# Patient Record
Sex: MALE | Race: WHITE | NOT HISPANIC OR LATINO | Employment: OTHER | URBAN - METROPOLITAN AREA
[De-identification: names, ages, dates, MRNs, and addresses within clinical notes are randomized per-mention and may not be internally consistent; named-entity substitution may affect disease eponyms.]

---

## 2018-04-30 ENCOUNTER — APPOINTMENT (OUTPATIENT)
Dept: PREADMISSION TESTING | Facility: HOSPITAL | Age: 48
End: 2018-04-30

## 2018-04-30 VITALS — WEIGHT: 227.29 LBS | BODY MASS INDEX: 30.12 KG/M2 | HEIGHT: 73 IN

## 2018-04-30 LAB
DEPRECATED RDW RBC AUTO: 46.1 FL (ref 37–54)
ERYTHROCYTE [DISTWIDTH] IN BLOOD BY AUTOMATED COUNT: 13.1 % (ref 11.3–14.5)
HCT VFR BLD AUTO: 46 % (ref 38.9–50.9)
HGB BLD-MCNC: 15.8 G/DL (ref 13.1–17.5)
MCH RBC QN AUTO: 32.8 PG (ref 27–31)
MCHC RBC AUTO-ENTMCNC: 34.3 G/DL (ref 32–36)
MCV RBC AUTO: 95.4 FL (ref 80–99)
PLATELET # BLD AUTO: 296 10*3/MM3 (ref 150–450)
PMV BLD AUTO: 9.5 FL (ref 6–12)
RBC # BLD AUTO: 4.82 10*6/MM3 (ref 4.2–5.76)
WBC NRBC COR # BLD: 8 10*3/MM3 (ref 3.5–10.8)

## 2018-04-30 PROCEDURE — 36415 COLL VENOUS BLD VENIPUNCTURE: CPT

## 2018-04-30 PROCEDURE — 85027 COMPLETE CBC AUTOMATED: CPT | Performed by: ANESTHESIOLOGY

## 2018-04-30 RX ORDER — HYDROCODONE BITARTRATE AND ACETAMINOPHEN 7.5; 325 MG/1; MG/1
1 TABLET ORAL EVERY 12 HOURS PRN
COMMUNITY
End: 2018-05-02 | Stop reason: HOSPADM

## 2018-04-30 RX ORDER — RANITIDINE 150 MG/1
150 TABLET ORAL DAILY
COMMUNITY

## 2018-05-01 ENCOUNTER — ANESTHESIA EVENT (OUTPATIENT)
Dept: PERIOP | Facility: HOSPITAL | Age: 48
End: 2018-05-01

## 2018-05-01 ENCOUNTER — HOSPITAL ENCOUNTER (INPATIENT)
Facility: HOSPITAL | Age: 48
LOS: 1 days | Discharge: HOME | End: 2018-05-02
Attending: ORTHOPAEDIC SURGERY | Admitting: ORTHOPAEDIC SURGERY

## 2018-05-01 ENCOUNTER — ANESTHESIA (OUTPATIENT)
Dept: PERIOP | Facility: HOSPITAL | Age: 48
End: 2018-05-01

## 2018-05-01 ENCOUNTER — APPOINTMENT (OUTPATIENT)
Dept: GENERAL RADIOLOGY | Facility: HOSPITAL | Age: 48
End: 2018-05-01

## 2018-05-01 PROBLEM — M54.2 NECK PAIN: Status: ACTIVE | Noted: 2018-05-01

## 2018-05-01 PROBLEM — Z98.1 S/P CERVICAL SPINAL FUSION: Status: ACTIVE | Noted: 2018-05-01

## 2018-05-01 PROBLEM — Z72.0 TOBACCO ABUSE: Status: ACTIVE | Noted: 2018-05-01

## 2018-05-01 PROBLEM — M54.12 CERVICAL RADICULOPATHY AT C7: Status: ACTIVE | Noted: 2018-05-01

## 2018-05-01 PROBLEM — K21.9 GERD (GASTROESOPHAGEAL REFLUX DISEASE): Status: ACTIVE | Noted: 2018-05-01

## 2018-05-01 PROCEDURE — C1713 ANCHOR/SCREW BN/BN,TIS/BN: HCPCS | Performed by: ORTHOPAEDIC SURGERY

## 2018-05-01 PROCEDURE — 25810000003 SODIUM CHLORIDE 0.9 % WITH KCL 20 MEQ 20-0.9 MEQ/L-% SOLUTION: Performed by: ORTHOPAEDIC SURGERY

## 2018-05-01 PROCEDURE — 25010000002 FENTANYL CITRATE (PF) 100 MCG/2ML SOLUTION: Performed by: NURSE ANESTHETIST, CERTIFIED REGISTERED

## 2018-05-01 PROCEDURE — 25010000002 DEXAMETHASONE PER 1 MG: Performed by: NURSE ANESTHETIST, CERTIFIED REGISTERED

## 2018-05-01 PROCEDURE — 0RG20A0 FUSION OF 2 OR MORE CERVICAL VERTEBRAL JOINTS WITH INTERBODY FUSION DEVICE, ANTERIOR APPROACH, ANTERIOR COLUMN, OPEN APPROACH: ICD-10-PCS | Performed by: ORTHOPAEDIC SURGERY

## 2018-05-01 PROCEDURE — 25010000002 ONDANSETRON PER 1 MG: Performed by: NURSE ANESTHETIST, CERTIFIED REGISTERED

## 2018-05-01 PROCEDURE — 0RB30ZZ EXCISION OF CERVICAL VERTEBRAL DISC, OPEN APPROACH: ICD-10-PCS | Performed by: ORTHOPAEDIC SURGERY

## 2018-05-01 PROCEDURE — 25010000002 NEOSTIGMINE 10 MG/10ML SOLUTION: Performed by: NURSE ANESTHETIST, CERTIFIED REGISTERED

## 2018-05-01 PROCEDURE — 25010000002 PHENYLEPHRINE PER 1 ML: Performed by: NURSE ANESTHETIST, CERTIFIED REGISTERED

## 2018-05-01 PROCEDURE — 72020 X-RAY EXAM OF SPINE 1 VIEW: CPT

## 2018-05-01 PROCEDURE — 94799 UNLISTED PULMONARY SVC/PX: CPT

## 2018-05-01 PROCEDURE — L0120 CERV FLEX N/ADJ FOAM PRE OTS: HCPCS | Performed by: ORTHOPAEDIC SURGERY

## 2018-05-01 PROCEDURE — 93010 ELECTROCARDIOGRAM REPORT: CPT | Performed by: INTERNAL MEDICINE

## 2018-05-01 PROCEDURE — 93005 ELECTROCARDIOGRAM TRACING: CPT | Performed by: ANESTHESIOLOGY

## 2018-05-01 PROCEDURE — 25010000002 PROPOFOL 10 MG/ML EMULSION: Performed by: NURSE ANESTHETIST, CERTIFIED REGISTERED

## 2018-05-01 PROCEDURE — 63710000001 DEXAMETHASONE PER 0.25 MG: Performed by: ORTHOPAEDIC SURGERY

## 2018-05-01 PROCEDURE — 25010000002 KETOROLAC TROMETHAMINE PER 15 MG: Performed by: NURSE ANESTHETIST, CERTIFIED REGISTERED

## 2018-05-01 PROCEDURE — 25010000003 CEFAZOLIN IN DEXTROSE 2-4 GM/100ML-% SOLUTION: Performed by: ORTHOPAEDIC SURGERY

## 2018-05-01 DEVICE — PLT SKYLINE 2LVL 32MM: Type: IMPLANTABLE DEVICE | Site: SPINE CERVICAL | Status: FUNCTIONAL

## 2018-05-01 DEVICE — SCRW SKYLINE VAR SD 14MM: Type: IMPLANTABLE DEVICE | Site: SPINE CERVICAL | Status: FUNCTIONAL

## 2018-05-01 DEVICE — ALLOGRFT BONE VIVIGEN CELLUAR MATRX FORMABLE 1CC: Type: IMPLANTABLE DEVICE | Status: FUNCTIONAL

## 2018-05-01 DEVICE — BENGAL SYSTEM LARGE IMPLANT 4MM, 7 DEGREES
Type: IMPLANTABLE DEVICE | Site: SPINE CERVICAL | Status: FUNCTIONAL
Brand: BENGAL

## 2018-05-01 RX ORDER — CEFAZOLIN SODIUM 2 G/100ML
2 INJECTION, SOLUTION INTRAVENOUS EVERY 8 HOURS
Status: COMPLETED | OUTPATIENT
Start: 2018-05-01 | End: 2018-05-02

## 2018-05-01 RX ORDER — BENZOIN/ALOE VERA/STORAX/TOLU 10-2-8-4%
TINCTURE TOPICAL AS NEEDED
Status: DISCONTINUED | OUTPATIENT
Start: 2018-05-01 | End: 2018-05-01 | Stop reason: HOSPADM

## 2018-05-01 RX ORDER — NALOXONE HCL 0.4 MG/ML
0.4 VIAL (ML) INJECTION
Status: DISCONTINUED | OUTPATIENT
Start: 2018-05-01 | End: 2018-05-02 | Stop reason: HOSPADM

## 2018-05-01 RX ORDER — FAMOTIDINE 20 MG/1
20 TABLET, FILM COATED ORAL ONCE
Status: COMPLETED | OUTPATIENT
Start: 2018-05-01 | End: 2018-05-01

## 2018-05-01 RX ORDER — LIDOCAINE HYDROCHLORIDE 10 MG/ML
INJECTION, SOLUTION EPIDURAL; INFILTRATION; INTRACAUDAL; PERINEURAL AS NEEDED
Status: DISCONTINUED | OUTPATIENT
Start: 2018-05-01 | End: 2018-05-01 | Stop reason: SURG

## 2018-05-01 RX ORDER — CEFAZOLIN SODIUM 2 G/100ML
2 INJECTION, SOLUTION INTRAVENOUS ONCE
Status: DISCONTINUED | OUTPATIENT
Start: 2018-05-01 | End: 2018-05-01 | Stop reason: HOSPADM

## 2018-05-01 RX ORDER — FENTANYL CITRATE 50 UG/ML
INJECTION, SOLUTION INTRAMUSCULAR; INTRAVENOUS AS NEEDED
Status: DISCONTINUED | OUTPATIENT
Start: 2018-05-01 | End: 2018-05-01 | Stop reason: SURG

## 2018-05-01 RX ORDER — PANTOPRAZOLE SODIUM 40 MG/10ML
40 INJECTION, POWDER, LYOPHILIZED, FOR SOLUTION INTRAVENOUS ONCE
Status: DISCONTINUED | OUTPATIENT
Start: 2018-05-01 | End: 2018-05-01

## 2018-05-01 RX ORDER — LIDOCAINE HYDROCHLORIDE 10 MG/ML
0.5 INJECTION, SOLUTION EPIDURAL; INFILTRATION; INTRACAUDAL; PERINEURAL ONCE AS NEEDED
Status: COMPLETED | OUTPATIENT
Start: 2018-05-01 | End: 2018-05-01

## 2018-05-01 RX ORDER — SODIUM CHLORIDE, SODIUM LACTATE, POTASSIUM CHLORIDE, CALCIUM CHLORIDE 600; 310; 30; 20 MG/100ML; MG/100ML; MG/100ML; MG/100ML
9 INJECTION, SOLUTION INTRAVENOUS CONTINUOUS
Status: DISCONTINUED | OUTPATIENT
Start: 2018-05-01 | End: 2018-05-02 | Stop reason: HOSPADM

## 2018-05-01 RX ORDER — SODIUM CHLORIDE 0.9 % (FLUSH) 0.9 %
1-10 SYRINGE (ML) INJECTION AS NEEDED
Status: DISCONTINUED | OUTPATIENT
Start: 2018-05-01 | End: 2018-05-02 | Stop reason: HOSPADM

## 2018-05-01 RX ORDER — SODIUM CHLORIDE 0.9 % (FLUSH) 0.9 %
1-10 SYRINGE (ML) INJECTION AS NEEDED
Status: DISCONTINUED | OUTPATIENT
Start: 2018-05-01 | End: 2018-05-01 | Stop reason: HOSPADM

## 2018-05-01 RX ORDER — GLYCOPYRROLATE 0.2 MG/ML
INJECTION INTRAMUSCULAR; INTRAVENOUS AS NEEDED
Status: DISCONTINUED | OUTPATIENT
Start: 2018-05-01 | End: 2018-05-01 | Stop reason: SURG

## 2018-05-01 RX ORDER — HYDRALAZINE HYDROCHLORIDE 20 MG/ML
10 INJECTION INTRAMUSCULAR; INTRAVENOUS EVERY 6 HOURS PRN
Status: DISCONTINUED | OUTPATIENT
Start: 2018-05-01 | End: 2018-05-02 | Stop reason: HOSPADM

## 2018-05-01 RX ORDER — DEXAMETHASONE 4 MG/1
4 TABLET ORAL EVERY 6 HOURS SCHEDULED
Status: DISCONTINUED | OUTPATIENT
Start: 2018-05-01 | End: 2018-05-02 | Stop reason: HOSPADM

## 2018-05-01 RX ORDER — FENTANYL CITRATE 50 UG/ML
50 INJECTION, SOLUTION INTRAMUSCULAR; INTRAVENOUS
Status: DISCONTINUED | OUTPATIENT
Start: 2018-05-01 | End: 2018-05-01 | Stop reason: HOSPADM

## 2018-05-01 RX ORDER — ROCURONIUM BROMIDE 10 MG/ML
INJECTION, SOLUTION INTRAVENOUS AS NEEDED
Status: DISCONTINUED | OUTPATIENT
Start: 2018-05-01 | End: 2018-05-01 | Stop reason: SURG

## 2018-05-01 RX ORDER — PROPOFOL 10 MG/ML
VIAL (ML) INTRAVENOUS AS NEEDED
Status: DISCONTINUED | OUTPATIENT
Start: 2018-05-01 | End: 2018-05-01 | Stop reason: SURG

## 2018-05-01 RX ORDER — NEOSTIGMINE METHYLSULFATE 1 MG/ML
INJECTION, SOLUTION INTRAVENOUS AS NEEDED
Status: DISCONTINUED | OUTPATIENT
Start: 2018-05-01 | End: 2018-05-01 | Stop reason: SURG

## 2018-05-01 RX ORDER — NICOTINE 21 MG/24HR
1 PATCH, TRANSDERMAL 24 HOURS TRANSDERMAL EVERY 24 HOURS
Status: DISCONTINUED | OUTPATIENT
Start: 2018-05-01 | End: 2018-05-02 | Stop reason: HOSPADM

## 2018-05-01 RX ORDER — BISACODYL 5 MG/1
5 TABLET, DELAYED RELEASE ORAL DAILY PRN
Status: DISCONTINUED | OUTPATIENT
Start: 2018-05-01 | End: 2018-05-02 | Stop reason: HOSPADM

## 2018-05-01 RX ORDER — ZOLPIDEM TARTRATE 5 MG/1
5 TABLET ORAL NIGHTLY PRN
Status: DISCONTINUED | OUTPATIENT
Start: 2018-05-01 | End: 2018-05-02 | Stop reason: HOSPADM

## 2018-05-01 RX ORDER — ONDANSETRON 2 MG/ML
INJECTION INTRAMUSCULAR; INTRAVENOUS AS NEEDED
Status: DISCONTINUED | OUTPATIENT
Start: 2018-05-01 | End: 2018-05-01 | Stop reason: SURG

## 2018-05-01 RX ORDER — ONDANSETRON 2 MG/ML
4 INJECTION INTRAMUSCULAR; INTRAVENOUS EVERY 6 HOURS PRN
Status: DISCONTINUED | OUTPATIENT
Start: 2018-05-01 | End: 2018-05-02 | Stop reason: HOSPADM

## 2018-05-01 RX ORDER — HYDROMORPHONE HYDROCHLORIDE 1 MG/ML
0.5 INJECTION, SOLUTION INTRAMUSCULAR; INTRAVENOUS; SUBCUTANEOUS
Status: DISCONTINUED | OUTPATIENT
Start: 2018-05-01 | End: 2018-05-01 | Stop reason: HOSPADM

## 2018-05-01 RX ORDER — VECURONIUM BROMIDE 20 MG/20ML
INJECTION, POWDER, LYOPHILIZED, FOR SOLUTION INTRAVENOUS AS NEEDED
Status: DISCONTINUED | OUTPATIENT
Start: 2018-05-01 | End: 2018-05-01 | Stop reason: SURG

## 2018-05-01 RX ORDER — FAMOTIDINE 20 MG/1
20 TABLET, FILM COATED ORAL EVERY 12 HOURS SCHEDULED
Status: DISCONTINUED | OUTPATIENT
Start: 2018-05-01 | End: 2018-05-02 | Stop reason: HOSPADM

## 2018-05-01 RX ORDER — ACETAMINOPHEN 325 MG/1
650 TABLET ORAL EVERY 4 HOURS PRN
Status: DISCONTINUED | OUTPATIENT
Start: 2018-05-01 | End: 2018-05-02 | Stop reason: HOSPADM

## 2018-05-01 RX ORDER — KETOROLAC TROMETHAMINE 30 MG/ML
INJECTION, SOLUTION INTRAMUSCULAR; INTRAVENOUS AS NEEDED
Status: DISCONTINUED | OUTPATIENT
Start: 2018-05-01 | End: 2018-05-01 | Stop reason: SURG

## 2018-05-01 RX ORDER — MAGNESIUM HYDROXIDE 1200 MG/15ML
LIQUID ORAL AS NEEDED
Status: DISCONTINUED | OUTPATIENT
Start: 2018-05-01 | End: 2018-05-01 | Stop reason: HOSPADM

## 2018-05-01 RX ORDER — OXYCODONE AND ACETAMINOPHEN 7.5; 325 MG/1; MG/1
1 TABLET ORAL EVERY 4 HOURS PRN
Status: DISCONTINUED | OUTPATIENT
Start: 2018-05-01 | End: 2018-05-02 | Stop reason: HOSPADM

## 2018-05-01 RX ORDER — BISACODYL 10 MG
10 SUPPOSITORY, RECTAL RECTAL DAILY PRN
Status: DISCONTINUED | OUTPATIENT
Start: 2018-05-01 | End: 2018-05-02 | Stop reason: HOSPADM

## 2018-05-01 RX ORDER — DEXAMETHASONE SODIUM PHOSPHATE 4 MG/ML
4 INJECTION, SOLUTION INTRA-ARTICULAR; INTRALESIONAL; INTRAMUSCULAR; INTRAVENOUS; SOFT TISSUE EVERY 6 HOURS SCHEDULED
Status: DISCONTINUED | OUTPATIENT
Start: 2018-05-01 | End: 2018-05-02 | Stop reason: HOSPADM

## 2018-05-01 RX ORDER — HYDROMORPHONE HYDROCHLORIDE 1 MG/ML
2 INJECTION, SOLUTION INTRAMUSCULAR; INTRAVENOUS; SUBCUTANEOUS EVERY 4 HOURS PRN
Status: DISCONTINUED | OUTPATIENT
Start: 2018-05-01 | End: 2018-05-02 | Stop reason: HOSPADM

## 2018-05-01 RX ORDER — DEXAMETHASONE SODIUM PHOSPHATE 4 MG/ML
INJECTION, SOLUTION INTRA-ARTICULAR; INTRALESIONAL; INTRAMUSCULAR; INTRAVENOUS; SOFT TISSUE AS NEEDED
Status: DISCONTINUED | OUTPATIENT
Start: 2018-05-01 | End: 2018-05-01 | Stop reason: SURG

## 2018-05-01 RX ORDER — SODIUM CHLORIDE AND POTASSIUM CHLORIDE 150; 900 MG/100ML; MG/100ML
100 INJECTION, SOLUTION INTRAVENOUS CONTINUOUS
Status: DISCONTINUED | OUTPATIENT
Start: 2018-05-01 | End: 2018-05-02 | Stop reason: HOSPADM

## 2018-05-01 RX ADMIN — EPHEDRINE SULFATE 10 MG: 50 INJECTION INTRAMUSCULAR; INTRAVENOUS; SUBCUTANEOUS at 08:57

## 2018-05-01 RX ADMIN — DEXAMETHASONE SODIUM PHOSPHATE 8 MG: 4 INJECTION, SOLUTION INTRAMUSCULAR; INTRAVENOUS at 08:11

## 2018-05-01 RX ADMIN — DEXAMETHASONE 4 MG: 4 TABLET ORAL at 13:13

## 2018-05-01 RX ADMIN — FAMOTIDINE 20 MG: 20 TABLET ORAL at 06:55

## 2018-05-01 RX ADMIN — FENTANYL CITRATE 50 MCG: 50 INJECTION, SOLUTION INTRAMUSCULAR; INTRAVENOUS at 08:18

## 2018-05-01 RX ADMIN — POTASSIUM CHLORIDE AND SODIUM CHLORIDE 100 ML/HR: 900; 150 INJECTION, SOLUTION INTRAVENOUS at 11:28

## 2018-05-01 RX ADMIN — NEOSTIGMINE METHYLSULFATE 3 MG: 1 INJECTION, SOLUTION INTRAVENOUS at 09:41

## 2018-05-01 RX ADMIN — ONDANSETRON 4 MG: 2 INJECTION INTRAMUSCULAR; INTRAVENOUS at 09:27

## 2018-05-01 RX ADMIN — OXYCODONE HYDROCHLORIDE AND ACETAMINOPHEN 1 TABLET: 7.5; 325 TABLET ORAL at 15:23

## 2018-05-01 RX ADMIN — EPHEDRINE SULFATE 10 MG: 50 INJECTION INTRAMUSCULAR; INTRAVENOUS; SUBCUTANEOUS at 08:43

## 2018-05-01 RX ADMIN — DEXAMETHASONE 4 MG: 4 TABLET ORAL at 18:41

## 2018-05-01 RX ADMIN — FAMOTIDINE 20 MG: 20 TABLET, FILM COATED ORAL at 21:26

## 2018-05-01 RX ADMIN — CEFAZOLIN SODIUM 2 G: 2 INJECTION, SOLUTION INTRAVENOUS at 15:23

## 2018-05-01 RX ADMIN — FENTANYL CITRATE 50 MCG: 50 INJECTION INTRAMUSCULAR; INTRAVENOUS at 10:04

## 2018-05-01 RX ADMIN — LIDOCAINE HYDROCHLORIDE 50 MG: 10 INJECTION, SOLUTION EPIDURAL; INFILTRATION; INTRACAUDAL; PERINEURAL at 08:03

## 2018-05-01 RX ADMIN — SODIUM CHLORIDE, POTASSIUM CHLORIDE, SODIUM LACTATE AND CALCIUM CHLORIDE: 600; 310; 30; 20 INJECTION, SOLUTION INTRAVENOUS at 07:58

## 2018-05-01 RX ADMIN — PHENYLEPHRINE HYDROCHLORIDE 100 MCG: 10 INJECTION INTRAVENOUS at 08:57

## 2018-05-01 RX ADMIN — OXYCODONE HYDROCHLORIDE AND ACETAMINOPHEN 1 TABLET: 7.5; 325 TABLET ORAL at 21:27

## 2018-05-01 RX ADMIN — FENTANYL CITRATE 50 MCG: 50 INJECTION INTRAMUSCULAR; INTRAVENOUS at 10:11

## 2018-05-01 RX ADMIN — GLYCOPYRROLATE 0.4 MG: 0.2 INJECTION, SOLUTION INTRAMUSCULAR; INTRAVENOUS at 09:41

## 2018-05-01 RX ADMIN — ROCURONIUM BROMIDE 40 MG: 10 SOLUTION INTRAVENOUS at 08:03

## 2018-05-01 RX ADMIN — FAMOTIDINE 20 MG: 20 TABLET, FILM COATED ORAL at 13:23

## 2018-05-01 RX ADMIN — LIDOCAINE HYDROCHLORIDE 0.5 ML: 10 INJECTION, SOLUTION EPIDURAL; INFILTRATION; INTRACAUDAL; PERINEURAL at 06:50

## 2018-05-01 RX ADMIN — SODIUM CHLORIDE, POTASSIUM CHLORIDE, SODIUM LACTATE AND CALCIUM CHLORIDE 9 ML/HR: 600; 310; 30; 20 INJECTION, SOLUTION INTRAVENOUS at 06:50

## 2018-05-01 RX ADMIN — FENTANYL CITRATE 100 MCG: 50 INJECTION, SOLUTION INTRAMUSCULAR; INTRAVENOUS at 08:53

## 2018-05-01 RX ADMIN — FENTANYL CITRATE 50 MCG: 50 INJECTION, SOLUTION INTRAMUSCULAR; INTRAVENOUS at 08:03

## 2018-05-01 RX ADMIN — PHENYLEPHRINE HYDROCHLORIDE 100 MCG: 10 INJECTION INTRAVENOUS at 08:41

## 2018-05-01 RX ADMIN — FENTANYL CITRATE 50 MCG: 50 INJECTION INTRAMUSCULAR; INTRAVENOUS at 10:16

## 2018-05-01 RX ADMIN — NICOTINE 1 PATCH: 21 PATCH, EXTENDED RELEASE TRANSDERMAL at 13:23

## 2018-05-01 RX ADMIN — PHENYLEPHRINE HYDROCHLORIDE 100 MCG: 10 INJECTION INTRAVENOUS at 09:06

## 2018-05-01 RX ADMIN — OXYCODONE HYDROCHLORIDE AND ACETAMINOPHEN 1 TABLET: 7.5; 325 TABLET ORAL at 11:19

## 2018-05-01 RX ADMIN — PROPOFOL 200 MG: 10 INJECTION, EMULSION INTRAVENOUS at 08:03

## 2018-05-01 RX ADMIN — KETOROLAC TROMETHAMINE 30 MG: 30 INJECTION, SOLUTION INTRAMUSCULAR at 09:31

## 2018-05-01 RX ADMIN — VECURONIUM BROMIDE 2 MG: 1 INJECTION, POWDER, LYOPHILIZED, FOR SOLUTION INTRAVENOUS at 08:53

## 2018-05-01 NOTE — ANESTHESIA PREPROCEDURE EVALUATION
Anesthesia Evaluation     Patient summary reviewed and Nursing notes reviewed   no history of anesthetic complications:  NPO Solid Status: > 8 hours  NPO Liquid Status: > 2 hours           Airway   Mallampati: II  TM distance: >3 FB  Neck ROM: full  Possible difficult intubation  Dental          Pulmonary - normal exam    breath sounds clear to auscultation  (+) a smoker Former, sleep apnea (snores heavily per wife and stops breathing),   Cardiovascular - normal exam  Exercise tolerance: good (4-7 METS)    ECG reviewed  Rhythm: regular  Rate: normal    (-) hypertension, valvular problems/murmurs, dysrhythmias, angina, GONZALEZ, hyperlipidemia      Neuro/Psych  (+) numbness (right hand),     GI/Hepatic/Renal/Endo    (+) obesity,  GERD well controlled,    (-) hepatitis, liver disease, no renal disease, diabetes, hypothyroidism    Musculoskeletal     (+) neck pain, radiculopathy Right upper extremity  Abdominal    Substance History      OB/GYN          Other        (-) blood dyscrasia, history of cancer                Anesthesia Plan    ASA 2     general   (Labs/studies reviewed  glidescope)  intravenous induction   Anesthetic plan and risks discussed with patient.    Plan discussed with CRNA.

## 2018-05-01 NOTE — ANESTHESIA PROCEDURE NOTES
Airway  Urgency: elective    Airway not difficult    General Information and Staff    Patient location during procedure: OR  CRNA: OLGA BANUELOS    Indications and Patient Condition  Indications for airway management: airway protection    Preoxygenated: yes  MILS not maintained throughout  Mask difficulty assessment: 1 - vent by mask    Final Airway Details  Final airway type: endotracheal airway      Successful airway: ETT  Cuffed: yes   Successful intubation technique: direct laryngoscopy  Facilitating devices/methods: intubating stylet  Endotracheal tube insertion site: oral  Blade: Bryant  Blade size: #3  ETT size: 7.5 mm  Cormack-Lehane Classification: grade I - full view of glottis  Placement verified by: chest auscultation and capnometry   Measured from: lips  ETT to lips (cm): 20  Number of attempts at approach: 1    Additional Comments  Negative epigastric sounds, Breath sound equal bilaterally with symmetric chest rise and fall. Atraumatic, no damage to lips or teeth during intubation

## 2018-05-01 NOTE — BRIEF OP NOTE
Ortho Spine CERVICAL DISCECTOMY ANTERIOR WITH FUSION WITH BENGAL CAGE ANTERIOR PLATING c5-7  Brief Op Note    Foster Saldivar  5/1/2018    Pre-op Diagnosis:   No diagnosis found.    Post-op Diagnosis:  No diagnosis found.    Procedure:  ACDF C5-6, C6-7  Cage placement with plating  Local autograft with placement cellular bone matrix    Anesthesia:  General    Staff:   Circulator: Shakila Marie RN; Laurence Baez RN  Radiology Technologist: Alex Rubio, RT  Scrub Person: Harlan Bartlett  Vendor Representative: Cortez Salazar  Nursing Assistant: Kierra Curtis CNA  Assistant: ADELAIDA Sheikh    Estimated Blood Loss:  10 mL    Specimens:  * No orders in the log *      Drains:   Penrose    Complications: None    Frank Ridley MD     Date: 5/1/2018  Time: 9:45 AM

## 2018-05-01 NOTE — ANESTHESIA POSTPROCEDURE EVALUATION
Patient: Foster Saldivar    Procedure Summary     Date:  05/01/18 Room / Location:   [facility]  /  [facility]    Anesthesia Start:  0758 Anesthesia Stop:      Procedure:  CERVICAL DISCECTOMY ANTERIOR WITH FUSION WITH BENGAL CAGE ANTERIOR PLATING c5-7 (N/A Spine Cervical) Diagnosis:      Surgeon:  Frank Ridley MD Provider:  Marine Garland MD    Anesthesia Type:  general ASA Status:  2          Anesthesia Type: general  Last vitals  BP   125/75   Temp   97.2   Pulse   96   Resp   18     SpO2   96     Post Anesthesia Care and Evaluation    Patient location during evaluation: PACU  Patient participation: complete - patient participated  Level of consciousness: awake and responsive to verbal stimuli  Pain score: 2  Pain management: adequate  Airway patency: patent  Anesthetic complications: No anesthetic complications    Cardiovascular status: acceptable  Respiratory status: acceptable  Hydration status: acceptable    Comments: Pt awake and responsive. SV. VSS. Report to RN. Patient Vitals in the past 24 hrs:  05/01/18 0651, BP:123/79, Temp:97.1 °F (36.2 °C), Temp src:Temporal Art, Pulse:64, Resp:16, SpO2:97 %  133/78. p 72. r 16. t 98.1

## 2018-05-01 NOTE — H&P
Patient Care Team:      Chief complaint Neck Pain    Subjective:    Patient is a 48 y.o.male presents with HNP, C6-7 and spondylosis C5-6.  Review of Systems:  General ROS: GERD  Cardiovascular ROS: no chest pain or dyspnea on exertion  Respiratory ROS: no cough, shortness of breath, or wheezing      Allergies: No Known Allergies       Latex: no  Contrast Dye: no    Home Meds    Prescriptions Prior to Admission   Medication Sig Dispense Refill Last Dose   • HYDROcodone-acetaminophen (NORCO) 7.5-325 MG per tablet Take 1 tablet by mouth Every 12 (Twelve) Hours As Needed for Moderate Pain .   Past Week at Unknown time   • raNITIdine (ZANTAC) 150 MG tablet Take 150 mg by mouth Daily.   5/1/2018 at 0530     PMH:   Past Medical History:   Diagnosis Date   • GERD (gastroesophageal reflux disease)    • Heartburn    • Neck pain    • PONV (postoperative nausea and vomiting)     no sugical hx   • Wears glasses \     PSH:    Past Surgical History:   Procedure Laterality Date   • NO PAST SURGERIES       Immunization History: pneumo: no   Flu: yes  Tetanus ?  Social History:   Tobacco: quit 4 days ago   Alcohol: 3-4 drinks 4,5 x wk      Physical Exam:/79 (BP Location: Right arm, Patient Position: Lying)   Pulse 64   Temp 97.1 °F (36.2 °C) (Temporal Artery )   Resp 16   SpO2 97%       General Appearance:    Alert, cooperative, no distress, appears stated age   Head:    Normocephalic, without obvious abnormality, atraumatic   Lungs:     Clear to auscultation bilaterally, respirations unlabored    Heart:   Regular rate and rhythm, S1 and S2 normal, no murmur, rub    or gallop    Abdomen:    Soft without tenderness   Breast Exam:    deferred   Genitalia:    deferred   Extremities:   Extremities normal, atraumatic, no cyanosis or edema   Skin:   Skin color, texture, turgor normal, no rashes or lesions   Neurologic:   Grossly intact        Cancer Patient: __ yes __no __unknown; If yes, clinical stage T:__ N:__M:__, stage  group    Impression: HNP C6-7, Spondylosis C5-6    Plan: ACDF, Bengal Cage, Anterior Plating  Charli Moss PA-C 5/1/2018 6:59 AM

## 2018-05-01 NOTE — PLAN OF CARE
Problem: Pain, Acute (Adult)  Goal: Acceptable Pain Control/Comfort Level  Outcome: Ongoing (interventions implemented as appropriate)   05/01/18 1581   Pain, Acute (Adult)   Acceptable Pain Control/Comfort Level making progress toward outcome

## 2018-05-01 NOTE — H&P
Patient Name: Foster Saldivar  MRN: [id number]  : 1970  DOS: 2018    Attending: Frank Ridley MD    Primary Care Provider: Luis Esteban MD      Chief complaint:  Neck Pain    Subjective   Patient is a 48 y.o. male presented for ACDF C5-6, C6-7 by Dr. Ridley under GA. He tolerated surgery well and is admitted for further medical management. His neck has been painful for a long time with numbness and tingling down his right arm.     When seen postop he is doing ok. He complains of pain in his shoulder blades. He denies sore throat or difficulty swallowing. He still has numbness and tingling of right hand. He denies nausea, shortness of breath or chest pain. No hx of DVT or PE.    ( Above is noted/ agree. Seen in his room afterwards. Ambulated. Sitting on couch. Comfortable. Good pain control.)wy       Allergies:  No Known Allergies    Meds:  Prescriptions Prior to Admission   Medication Sig Dispense Refill Last Dose   • HYDROcodone-acetaminophen (NORCO) 7.5-325 MG per tablet Take 1 tablet by mouth Every 12 (Twelve) Hours As Needed for Moderate Pain .   Past Week at Unknown time   • raNITIdine (ZANTAC) 150 MG tablet Take 150 mg by mouth Daily.   2018 at 0530       History:   Past Medical History:   Diagnosis Date   • GERD (gastroesophageal reflux disease)    • Heartburn    • Neck pain    • PONV (postoperative nausea and vomiting)     no sugical hx   • Wears glasses \     Past Surgical History:   Procedure Laterality Date   • NO PAST SURGERIES       History reviewed. No pertinent family history.  Social History   Substance Use Topics   • Smoking status: Former Smoker     Packs/day: 1.50     Years: 30.00     Types: Cigarettes     Quit date: 2018   • Smokeless tobacco: Never Used   • Alcohol use Yes      Comment: 3-5 DRINKS, MAYBE 3 DAYS PER WEEK   He is  with 2 children. He is self-employed in concrete.     Review of Systems  Pertinent items are noted in HPI, all other  systems reviewed and negative    Vital Signs  /70   Pulse 90   Temp 97.4 °F (36.3 °C)   Resp 24   SpO2 95%     Physical Exam:    General Appearance:    Alert, cooperative, in no acute distress   Head:    Normocephalic, without obvious abnormality, atraumatic   Eyes:            Lids and lashes normal, conjunctivae and sclerae normal, no   icterus, no pallor, corneas clear    Ears:    Ears appear intact with no abnormalities noted   Neck:   Bulky dressing CDI. Soft c-collar present   Lungs:     Clear to auscultation, diminished bases. respirations regular, even and unlabored    Heart:    Regular rhythm and normal rate, normal S1 and S2, no            murmur, no gallop, no rub, no click   Abdomen:     Normal bowel sounds, no masses, no organomegaly, soft        non-tender, non-distended, no guarding, no rebound                 tenderness   Genitalia:    Deferred   Extremities:   Moves all extremities well, no edema, no cyanosis, no              redness   Pulses:   Pulses palpable and equal bilaterally   Skin:   No bleeding, bruising or rash   Neurologic:   Cranial nerves 2 - 12 grossly intact, sensation intact      I reviewed the patient's new clinical results.         Results from last 7 days  Lab Units 18  1006   WBC 10*3/mm3 8.00   HEMOGLOBIN g/dL 15.8   HEMATOCRIT % 46.0   PLATELETS 10*3/mm3 296     Assessment and Plan:   Principal Problem:    S/P cervical spinal fusion  Active Problems:    Cervical radiculopathy at C7    GERD (gastroesophageal reflux disease)    Tobacco abuse, quit 18      Plan  1. Ambulate  2. Pain control-prns   3. IS-encourage  4. DVT proph- [facility]  5. Bowel regimen  6. Resume home medications as appropriate  7. Monitor post-op labs  8. DC planning for home, likely tomorrow    GERD  - Continue home H2B    Tobacco abuse  - Nicotine patch ( counseled on tobacco cessation. Tobacco Cessation Counselin  minutes of tobacco cessation counseling provided, including but not  limited to, risks of ongoing tobacco use, pertinence to current or future health status and availability/examples of cessation resources.  Patient expresses desire to quit).       I have personally performed the evaluation on this patient. My history is consistant  with HPI obtained. My exam finding are listed above. I have personally reviewed and discussed the above formulated treatment plan with VALENTINA Belcher MD  05/01/18  1:47 PM

## 2018-05-02 VITALS
TEMPERATURE: 97.6 F | OXYGEN SATURATION: 96 % | DIASTOLIC BLOOD PRESSURE: 77 MMHG | SYSTOLIC BLOOD PRESSURE: 133 MMHG | HEART RATE: 91 BPM | RESPIRATION RATE: 18 BRPM

## 2018-05-02 PROBLEM — D72.829 LEUKOCYTOSIS: Status: ACTIVE | Noted: 2018-05-02

## 2018-05-02 PROBLEM — G89.18 ACUTE POSTOPERATIVE PAIN: Status: ACTIVE | Noted: 2018-05-02

## 2018-05-02 LAB
ANION GAP SERPL CALCULATED.3IONS-SCNC: 8 MMOL/L (ref 3–11)
BUN BLD-MCNC: 12 MG/DL (ref 9–23)
BUN/CREAT SERPL: 15 (ref 7–25)
CALCIUM SPEC-SCNC: 9.3 MG/DL (ref 8.7–10.4)
CHLORIDE SERPL-SCNC: 106 MMOL/L (ref 99–109)
CO2 SERPL-SCNC: 22 MMOL/L (ref 20–31)
CREAT BLD-MCNC: 0.8 MG/DL (ref 0.6–1.3)
DEPRECATED RDW RBC AUTO: 46.7 FL (ref 37–54)
ERYTHROCYTE [DISTWIDTH] IN BLOOD BY AUTOMATED COUNT: 13.2 % (ref 11.3–14.5)
GFR SERPL CREATININE-BSD FRML MDRD: 102 ML/MIN/1.73
GLUCOSE BLD-MCNC: 137 MG/DL (ref 70–100)
HCT VFR BLD AUTO: 43 % (ref 38.9–50.9)
HGB BLD-MCNC: 14.6 G/DL (ref 13.1–17.5)
MCH RBC QN AUTO: 32.7 PG (ref 27–31)
MCHC RBC AUTO-ENTMCNC: 34 G/DL (ref 32–36)
MCV RBC AUTO: 96.4 FL (ref 80–99)
PLATELET # BLD AUTO: 306 10*3/MM3 (ref 150–450)
PMV BLD AUTO: 10.1 FL (ref 6–12)
POTASSIUM BLD-SCNC: 4.2 MMOL/L (ref 3.5–5.5)
RBC # BLD AUTO: 4.46 10*6/MM3 (ref 4.2–5.76)
SODIUM BLD-SCNC: 136 MMOL/L (ref 132–146)
WBC NRBC COR # BLD: 13.66 10*3/MM3 (ref 3.5–10.8)

## 2018-05-02 PROCEDURE — 85027 COMPLETE CBC AUTOMATED: CPT | Performed by: NURSE PRACTITIONER

## 2018-05-02 PROCEDURE — 25010000003 CEFAZOLIN IN DEXTROSE 2-4 GM/100ML-% SOLUTION: Performed by: ORTHOPAEDIC SURGERY

## 2018-05-02 PROCEDURE — 80048 BASIC METABOLIC PNL TOTAL CA: CPT | Performed by: NURSE PRACTITIONER

## 2018-05-02 PROCEDURE — 63710000001 DEXAMETHASONE PER 0.25 MG: Performed by: ORTHOPAEDIC SURGERY

## 2018-05-02 RX ORDER — DOCUSATE SODIUM 100 MG/1
100 CAPSULE, LIQUID FILLED ORAL 2 TIMES DAILY
Qty: 60 CAPSULE | Refills: 0 | Status: SHIPPED | OUTPATIENT
Start: 2018-05-02

## 2018-05-02 RX ORDER — HYDROCODONE BITARTRATE AND ACETAMINOPHEN 10; 325 MG/1; MG/1
1 TABLET ORAL EVERY 6 HOURS PRN
Qty: 40 TABLET | Refills: 0
Start: 2018-05-02

## 2018-05-02 RX ADMIN — OXYCODONE HYDROCHLORIDE AND ACETAMINOPHEN 1 TABLET: 7.5; 325 TABLET ORAL at 08:46

## 2018-05-02 RX ADMIN — DEXAMETHASONE 4 MG: 4 TABLET ORAL at 00:41

## 2018-05-02 RX ADMIN — CEFAZOLIN SODIUM 2 G: 2 INJECTION, SOLUTION INTRAVENOUS at 00:41

## 2018-05-02 RX ADMIN — DEXAMETHASONE 4 MG: 4 TABLET ORAL at 06:39

## 2018-05-02 RX ADMIN — FAMOTIDINE 20 MG: 20 TABLET, FILM COATED ORAL at 08:46

## 2018-05-02 NOTE — PROGRESS NOTES
Ortho Spine Progress Note     LOS: 1 day   Patient Care Team:  Luis Esteban MD as PCP - General (Internal Medicine)    Subjective   Doing well  Arm pain much improved  Objective     Vital Signs:  /78 (BP Location: Right arm, Patient Position: Lying)   Pulse 100   Temp 98.3 °F (36.8 °C) (Oral)   Resp 16   SpO2 94%          Labs:  Lab Results (last 24 hours)     Procedure Component Value Units Date/Time    Basic Metabolic Panel [[id number]]  (Abnormal) Collected:  05/02/18 0359    Specimen:  Blood Updated:  05/02/18 0546     Glucose 137 (H) mg/dL      BUN 12 mg/dL      Creatinine 0.80 mg/dL      Sodium 136 mmol/L      Potassium 4.2 mmol/L      Chloride 106 mmol/L      CO2 22.0 mmol/L      Calcium 9.3 mg/dL      eGFR Non African Amer 102 mL/min/1.73      BUN/Creatinine Ratio 15.0     Anion Gap 8.0 mmol/L     Narrative:       National Kidney Foundation Guidelines    Stage     Description        GFR  1         Normal or High     90+  2         Mild decrease      60-89  3         Moderate decrease  30-59  4         Severe decrease    15-29  5         Kidney failure     <15    CBC (No Diff) [[id number]]  (Abnormal) Collected:  05/02/18 0359    Specimen:  Blood Updated:  05/02/18 0503     WBC 13.66 (H) 10*3/mm3      Comment: Result checked        RBC 4.46 10*6/mm3      Hemoglobin 14.6 g/dL      Hematocrit 43.0 %      MCV 96.4 fL      MCH 32.7 (H) pg      MCHC 34.0 g/dL      RDW 13.2 %      RDW-SD 46.7 fl      MPV 10.1 fL      Platelets 306 10*3/mm3           Physical Exam:  Neuro intact    Assessment/Plan   Doing well  Home today    Frank Ridley MD  05/02/18  9:34 AM

## 2018-05-02 NOTE — PAYOR COMM NOTE
"Foster Styles (48 y.o. Male)   Id # [id number]  Ramona Rae RN, BSN  Phone # [phone]  Fax # [phone]    Date of Birth Social Security Number Address Home Phone MRN    1970  [address] [id number]  [id number]    Baptism Marital Status          None        Admission Date Admission Type Admitting Provider Attending Provider Department, Room/Bed    18 Elective Frank Ridley MD Stephens, George Chris, MD [address], [address]    Discharge Date Discharge Disposition Discharge Destination         Home or Self Care              Attending Provider:  Frank Ridley MD    Allergies:  No Known Allergies    Isolation:  None   Infection:  None   Code Status:  FULL    Ht:  185.4 cm (73\")   Wt:  103 kg (227 lb 4.7 oz)    Admission Cmt:  None   Principal Problem:  S/P cervical spinal fusion [Z98.1]                 Active Insurance as of 2018     Primary Coverage     Payor Plan Insurance Group Employer/Plan Group    [employer] [id number]     Payor Plan Address Payor Plan Phone Number Effective From Effective To    PO   2018     [place] [address]       Subscriber Name Subscriber Birth Date Member ID       BUTCH STYLES 1976 [id number]                 Emergency Contacts      (Rel.) Home Phone Work Phone Mobile Phone    Butch Styles (Spouse) -- -- [phone]               History & Physical      Keya Belcher MD at 2018 10:31 AM          Patient Name: Foster Styles  MRN: [id number]  : 1970  DOS: 2018    Attending: Frank Ridley MD    Primary Care Provider: Luis Esteban MD      Chief complaint:  Neck Pain    Subjective   Patient is a 48 y.o. male presented for ACDF C5-6, C6-7 by Dr. Ridley under GA. He tolerated surgery well and is admitted for further medical management. His neck has been painful for a long time with numbness and tingling down " his right arm.     When seen postop he is doing ok. He complains of pain in his shoulder blades. He denies sore throat or difficulty swallowing. He still has numbness and tingling of right hand. He denies nausea, shortness of breath or chest pain. No hx of DVT or PE.    ( Above is noted/ agree. Seen in his room afterwards. Ambulated. Sitting on couch. Comfortable. Good pain control.)wy       Allergies:  No Known Allergies    Meds:  Prescriptions Prior to Admission   Medication Sig Dispense Refill Last Dose   • HYDROcodone-acetaminophen (NORCO) 7.5-325 MG per tablet Take 1 tablet by mouth Every 12 (Twelve) Hours As Needed for Moderate Pain .   Past Week at Unknown time   • raNITIdine (ZANTAC) 150 MG tablet Take 150 mg by mouth Daily.   5/1/2018 at 0530       History:   Past Medical History:   Diagnosis Date   • GERD (gastroesophageal reflux disease)    • Heartburn    • Neck pain    • PONV (postoperative nausea and vomiting)     no sugical hx   • Wears glasses \     Past Surgical History:   Procedure Laterality Date   • NO PAST SURGERIES       History reviewed. No pertinent family history.  Social History   Substance Use Topics   • Smoking status: Former Smoker     Packs/day: 1.50     Years: 30.00     Types: Cigarettes     Quit date: 4/27/2018   • Smokeless tobacco: Never Used   • Alcohol use Yes      Comment: 3-5 DRINKS, MAYBE 3 DAYS PER WEEK   He is  with 2 children. He is self-employed in "[employer].     Review of Systems  Pertinent items are noted in HPI, all other systems reviewed and negative    Vital Signs  /70   Pulse 90   Temp 97.4 °F (36.3 °C)   Resp 24   SpO2 95%     Physical Exam:    General Appearance:    Alert, cooperative, in no acute distress   Head:    Normocephalic, without obvious abnormality, atraumatic   Eyes:            Lids and lashes normal, conjunctivae and sclerae normal, no   icterus, no pallor, corneas clear    Ears:    Ears appear intact with no abnormalities noted   Neck:    Bulky dressing CDI. Soft c-collar present   Lungs:     Clear to auscultation, diminished bases. respirations regular, even and unlabored    Heart:    Regular rhythm and normal rate, normal S1 and S2, no            murmur, no gallop, no rub, no click   Abdomen:     Normal bowel sounds, no masses, no organomegaly, soft        non-tender, non-distended, no guarding, no rebound                 tenderness   Genitalia:    Deferred   Extremities:   Moves all extremities well, no edema, no cyanosis, no              redness   Pulses:   Pulses palpable and equal bilaterally   Skin:   No bleeding, bruising or rash   Neurologic:   Cranial nerves 2 - 12 grossly intact, sensation intact      I reviewed the patient's new clinical results.         Results from last 7 days  Lab Units 18  1006   WBC 10*3/mm3 8.00   HEMOGLOBIN g/dL 15.8   HEMATOCRIT % 46.0   PLATELETS 10*3/mm3 296     Assessment and Plan:   Principal Problem:    S/P cervical spinal fusion  Active Problems:    Cervical radiculopathy at C7    GERD (gastroesophageal reflux disease)    Tobacco abuse, quit 18      Plan  1. Ambulate  2. Pain control-prns   3. IS-encourage  4. DVT proph- [facility]  5. Bowel regimen  6. Resume home medications as appropriate  7. Monitor post-op labs  8. DC planning for home, likely tomorrow    GERD  - Continue home H2B    Tobacco abuse  - Nicotine patch ( counseled on tobacco cessation. Tobacco Cessation Counselin  minutes of tobacco cessation counseling provided, including but not limited to, risks of ongoing tobacco use, pertinence to current or future health status and availability/examples of cessation resources.  Patient expresses desire to quit).       I have personally performed the evaluation on this patient. My history is consistant  with HPI obtained. My exam finding are listed above. I have personally reviewed and discussed the above formulated treatment plan with JADE.      Keya Belcher MD  18  1:47  PM    Electronically signed by Keya Belcher MD at 5/1/2018  1:48 PM     Frank Ridley MD at 5/1/2018  6:59 AM            Patient Care Team:      Chief complaint Neck Pain    Subjective:    Patient is a 48 y.o.male presents with HNP, C6-7 and spondylosis C5-6.  Review of Systems:  General ROS: GERD  Cardiovascular ROS: no chest pain or dyspnea on exertion  Respiratory ROS: no cough, shortness of breath, or wheezing      Allergies: No Known Allergies       Latex: no  Contrast Dye: no    Home Meds    Prescriptions Prior to Admission   Medication Sig Dispense Refill Last Dose   • HYDROcodone-acetaminophen (NORCO) 7.5-325 MG per tablet Take 1 tablet by mouth Every 12 (Twelve) Hours As Needed for Moderate Pain .   Past Week at Unknown time   • raNITIdine (ZANTAC) 150 MG tablet Take 150 mg by mouth Daily.   5/1/2018 at 0530     PMH:   Past Medical History:   Diagnosis Date   • GERD (gastroesophageal reflux disease)    • Heartburn    • Neck pain    • PONV (postoperative nausea and vomiting)     no sugical hx   • Wears glasses \     PSH:    Past Surgical History:   Procedure Laterality Date   • NO PAST SURGERIES       Immunization History: pneumo: no   Flu: yes  Tetanus ?  Social History:   Tobacco: quit 4 days ago   Alcohol: 3-4 drinks 4,5 x wk      Physical Exam:/79 (BP Location: Right arm, Patient Position: Lying)   Pulse 64   Temp 97.1 °F (36.2 °C) (Temporal Artery )   Resp 16   SpO2 97%       General Appearance:    Alert, cooperative, no distress, appears stated age   Head:    Normocephalic, without obvious abnormality, atraumatic   Lungs:     Clear to auscultation bilaterally, respirations unlabored    Heart:   Regular rate and rhythm, S1 and S2 normal, no murmur, rub    or gallop    Abdomen:    Soft without tenderness   Breast Exam:    deferred   Genitalia:    deferred   Extremities:   Extremities normal, atraumatic, no cyanosis or edema   Skin:   Skin color, texture, turgor normal, no rashes  or lesions   Neurologic:   Grossly intact        Cancer Patient: __ yes __no __unknown; If yes, clinical stage T:__ N:__M:__, stage group    Impression: HNP C6-7, Spondylosis C5-6    Plan: ACDF, Bengal Cage, Anterior Plating  Charli Moss PA-C 5/1/2018 6:59 AM          Electronically signed by Frank Ridley MD at 5/1/2018  7:52 AM          Operative/Procedure Notes (last 72 hours) (Notes from 4/29/2018 10:02 AM through 5/2/2018 10:02 AM)      Frank Ridley MD at 5/1/2018  8:25 AM        Ortho Spine CERVICAL DISCECTOMY ANTERIOR WITH FUSION WITH BENGAL CAGE ANTERIOR PLATING c5-7  Brief Op Note    Foster Saldivar  5/1/2018    Pre-op Diagnosis:   No diagnosis found.    Post-op Diagnosis:  No diagnosis found.    Procedure:  ACDF C5-6, C6-7  Cage placement with plating  Local autograft with placement cellular bone matrix    Anesthesia:  General    Staff:   Circulator: Shakila Marie RN; Laurence Baez RN  Radiology Technologist: Alex Rubio, RT  Scrub Person: Harlan Bartlett  Vendor Representative: Cortez Salazar  Nursing Assistant: Kierra Curtis CNA  Assistant: ADELAIDA Sheikh    Estimated Blood Loss:  10 mL    Specimens:  * No orders in the log *      Drains:   Penrose    Complications: None    Frank Ridley MD     Date: 5/1/2018  Time: 9:45 AM    Electronically signed by Frank Ridley MD at 5/1/2018  9:47 AM          Physician Progress Notes (last 72 hours) (Notes from 4/29/2018 10:02 AM through 5/2/2018 10:02 AM)      Frank Ridley MD at 5/2/2018  9:33 AM          Ortho Spine Progress Note     LOS: 1 day   Patient Care Team:  Luis Esteban MD as PCP - General (Internal Medicine)    Subjective   Doing well  Arm pain much improved  Objective     Vital Signs:  /78 (BP Location: Right arm, Patient Position: Lying)   Pulse 100   Temp 98.3 °F (36.8 °C) (Oral)   Resp 16   SpO2 94%          Labs:  Lab Results (last 24 hours)     Procedure  Component Value Units Date/Time    Basic Metabolic Panel [[id number]]  (Abnormal) Collected:  05/02/18 0359    Specimen:  Blood Updated:  05/02/18 0546     Glucose 137 (H) mg/dL      BUN 12 mg/dL      Creatinine 0.80 mg/dL      Sodium 136 mmol/L      Potassium 4.2 mmol/L      Chloride 106 mmol/L      CO2 22.0 mmol/L      Calcium 9.3 mg/dL      eGFR Non African Amer 102 mL/min/1.73      BUN/Creatinine Ratio 15.0     Anion Gap 8.0 mmol/L     Narrative:       National Kidney Foundation Guidelines    Stage     Description        GFR  1         Normal or High     90+  2         Mild decrease      60-89  3         Moderate decrease  30-59  4         Severe decrease    15-29  5         Kidney failure     <15    CBC (No Diff) [[id number]]  (Abnormal) Collected:  05/02/18 0359    Specimen:  Blood Updated:  05/02/18 0503     WBC 13.66 (H) 10*3/mm3      Comment: Result checked        RBC 4.46 10*6/mm3      Hemoglobin 14.6 g/dL      Hematocrit 43.0 %      MCV 96.4 fL      MCH 32.7 (H) pg      MCHC 34.0 g/dL      RDW 13.2 %      RDW-SD 46.7 fl      MPV 10.1 fL      Platelets 306 10*3/mm3           Physical Exam:  Neuro intact    Assessment/Plan   Doing well  Home today    Frank Ridley MD  05/02/18  9:34 AM      Electronically signed by Frank Ridley MD at 5/2/2018  9:35 AM

## 2018-05-02 NOTE — PROGRESS NOTES
Continued Stay Note  [facility]     Patient Name: Foster Saldivar  MRN: [id number]  Today's Date: 5/2/2018    Admit Date: 5/1/2018          Discharge Plan     Row Name 05/02/18 1120       Plan    Plan Home    Patient/Family in Agreement with Plan yes    Plan Comments Spoke with patient and wife at bedside. They live in a one story in [place]. He is independent with ADL's and mobility. He denies any DME/HH needs at this time. Plan is to return home. Family to transport. CM following.     Final Discharge Disposition Code 01 - home or self-care              Discharge Codes    No documentation.       Expected Discharge Date and Time     Expected Discharge Date Expected Discharge Time    May 2, 2018             Corazon Allen RN

## 2018-05-02 NOTE — DISCHARGE SUMMARY
Patient Name: Foster Saldivar  MRN: [id number]  : 1970  DOS: 2018    Attending: Frank Ridley MD    Primary Care Provider: Luis Esteban MD    Date of Admission:.2018  6:19 AM    Date of Discharge:  2018    Discharge Diagnosis: Principal Problem:    S/P cervical spinal fusion  Active Problems:    Cervical radiculopathy at C7    Neck pain    GERD (gastroesophageal reflux disease)    Tobacco abuse, quit 18    Leukocytosis, mild, likely reactive    Acute postoperative pain      Hospital Course  Patient is a 48 y.o. male presented for ACDF C5-6, C6-7 by Dr. Ridley under GA. He tolerated surgery well and was admitted for further medical management. His neck has been painful for a long time with numbness and tingling down his right arm.       Patient was provided pain medications as needed for pain control.    Adjustments were made to pain medications to optimize postop pain management. Risks and benefits of opiate medications discussed with patient.    He used an IS for atelectasis prophylaxis and mechanicals for DVT prophylaxis.  Home medications were resumed as appropriate, and labs were monitored and remained fairly stable.     With the progress he has made, he is ready for DC home today.    Discussed with patient regarding plan and he shows understanding and agreement.        Procedures Performed  Procedure(s):  CERVICAL DISCECTOMY ANTERIOR WITH FUSION WITH BENGAL CAGE ANTERIOR PLATING c5-7       Pertinent Test Results:    I reviewed the patient's new clinical results.     Results from last 7 days  Lab Units 18  0359 18  1006   WBC 10*3/mm3 13.66* 8.00   HEMOGLOBIN g/dL 14.6 15.8   HEMATOCRIT % 43.0 46.0   PLATELETS 10*3/mm3 306 296       Results from last 7 days  Lab Units 18  0359   SODIUM mmol/L 136   POTASSIUM mmol/L 4.2   CHLORIDE mmol/L 106   CO2 mmol/L 22.0   BUN mg/dL 12   CREATININE mg/dL 0.80   CALCIUM mg/dL 9.3   GLUCOSE mg/dL 137*     I reviewed  the patient's new imaging including images and reports.      Discharge Assessment:    Vital Signs  /78 (BP Location: Right arm, Patient Position: Lying)   Pulse 100   Temp 98.3 °F (36.8 °C) (Oral)   Resp 16   SpO2 94%   Temp (24hrs), Av °F (36.7 °C), Min:97.2 °F (36.2 °C), Max:98.6 °F (37 °C)      General Appearance:    Alert, cooperative, in no acute distress   Lungs:     Clear to auscultation,respirations regular, even and                   unlabored    Heart:    Regular rhythm and normal rate, normal S1 and S2   Abdomen:     Normal bowel sounds, no masses, no organomegaly, soft        non-tender, non-distended, no guarding, no rebound                 tenderness   Extremities:   Moves all extremities well, no edema, no cyanosis, no              redness   Pulses:   Pulses palpable and equal bilaterally   Skin:   No bleeding, bruising or rash. Neck dressing CDI. Soft c-collar present   Neurologic:   Cranial nerves 2 - 12 grossly intact, sensation intact       Discharge Disposition: Home    Discharge Medications   Foster Saldivar   Home Medication Instructions [id number]    Printed on:18 0937   Medication Information                      docusate sodium (COLACE) 100 MG capsule  Take 1 capsule by mouth 2 (Two) Times a Day.             HYDROcodone-acetaminophen (NORCO)  MG per tablet  Take 1 tablet by mouth Every 6 (Six) Hours As Needed for Moderate Pain .             raNITIdine (ZANTAC) 150 MG tablet  Take 150 mg by mouth Daily.                 Discharge Diet: Regular diet    Activity at Discharge: ambulate    Follow-up Appointments  Dr. Ridley per his orders    Discharge took over 30 min.    I have personally performed the evaluation on this patient. My history is consistant  with HPI obtained. My exam finding are listed above. I have personally reviewed and discussed the above formulated treatment plan with patient and  APRN.    JADE Bailey  18  9:37 AM

## 2018-05-03 NOTE — PAYOR COMM NOTE
"Foster Styles (48 y.o. Male)   Id # [id number]  Ramona Rae RN, BSN  Phone # [phone]  Fax # [phone]    Date of Birth Social Security Number Address Home Phone MRN    1970  [address] [id number]  [id number]    [employer] Marital Status          None        Admission Date Admission Type Admitting Provider Attending Provider Department, Room/Bed    18 Elective Frank Ridley MD  [facility] 3H, S374/1    Discharge Date Discharge Disposition Discharge Destination        2018 Home or Self Care              Attending Provider:  (none)   Allergies:  No Known Allergies    Isolation:  None   Infection:  None   Code Status:  Prior    Ht:  185.4 cm (73\")   Wt:  103 kg (227 lb 4.7 oz)    Admission Cmt:  None   Principal Problem:  S/P cervical spinal fusion [Z98.1]                 Active Insurance as of 2018     Primary Coverage     Payor Plan Insurance Group Employer/Plan Group    [employer] [id number]     Payor Plan Address Payor Plan Phone Number Effective From Effective To    PO   2018     [place] [address]       Subscriber Name Subscriber Birth Date Member ID       BUTCH STYLES 1976 [id number]                 Emergency Contacts      (Rel.) Home Phone Work Phone Mobile Phone    Butch Styles (Spouse) -- -- [phone]               Discharge Summary      JADE Bailey at 2018  9:35 AM          Patient Name: Foster Styles  MRN: [id number]  : 1970  DOS: 2018    Attending: Frank Ridley MD    Primary Care Provider: Luis Esteban MD    Date of Admission:.2018  6:19 AM    Date of Discharge:  2018    Discharge Diagnosis: Principal Problem:    S/P cervical spinal fusion  Active Problems:    Cervical radiculopathy at C7    Neck pain    GERD (gastroesophageal reflux disease)    Tobacco abuse, quit 18    Leukocytosis, mild, likely " reactive    Acute postoperative pain      Hospital Course  Patient is a 48 y.o. male presented for ACDF C5-6, C6-7 by Dr. Ridley under GA. He tolerated surgery well and was admitted for further medical management. His neck has been painful for a long time with numbness and tingling down his right arm.       Patient was provided pain medications as needed for pain control.    Adjustments were made to pain medications to optimize postop pain management. Risks and benefits of opiate medications discussed with patient.    He used an IS for atelectasis prophylaxis and mechanicals for DVT prophylaxis.  Home medications were resumed as appropriate, and labs were monitored and remained fairly stable.     With the progress he has made, he is ready for DC home today.    Discussed with patient regarding plan and he shows understanding and agreement.        Procedures Performed  Procedure(s):  CERVICAL DISCECTOMY ANTERIOR WITH FUSION WITH BENGAL CAGE ANTERIOR PLATING c5-7       Pertinent Test Results:    I reviewed the patient's new clinical results.     Results from last 7 days  Lab Units 18  0359 18  1006   WBC 10*3/mm3 13.66* 8.00   HEMOGLOBIN g/dL 14.6 15.8   HEMATOCRIT % 43.0 46.0   PLATELETS 10*3/mm3 306 296       Results from last 7 days  Lab Units 18  0359   SODIUM mmol/L 136   POTASSIUM mmol/L 4.2   CHLORIDE mmol/L 106   CO2 mmol/L 22.0   BUN mg/dL 12   CREATININE mg/dL 0.80   CALCIUM mg/dL 9.3   GLUCOSE mg/dL 137*     I reviewed the patient's new imaging including images and reports.      Discharge Assessment:    Vital Signs  /78 (BP Location: Right arm, Patient Position: Lying)   Pulse 100   Temp 98.3 °F (36.8 °C) (Oral)   Resp 16   SpO2 94%   Temp (24hrs), Av °F (36.7 °C), Min:97.2 °F (36.2 °C), Max:98.6 °F (37 °C)      General Appearance:    Alert, cooperative, in no acute distress   Lungs:     Clear to auscultation,respirations regular, even and                   unlabored     Heart:    Regular rhythm and normal rate, normal S1 and S2   Abdomen:     Normal bowel sounds, no masses, no organomegaly, soft        non-tender, non-distended, no guarding, no rebound                 tenderness   Extremities:   Moves all extremities well, no edema, no cyanosis, no              redness   Pulses:   Pulses palpable and equal bilaterally   Skin:   No bleeding, bruising or rash. Neck dressing CDI. Soft c-collar present   Neurologic:   Cranial nerves 2 - 12 grossly intact, sensation intact       Discharge Disposition: Home    Discharge Medications   Foster Saldivar   Home Medication Instructions [id number]    Printed on:05/02/18 0937   Medication Information                      docusate sodium (COLACE) 100 MG capsule  Take 1 capsule by mouth 2 (Two) Times a Day.             HYDROcodone-acetaminophen (NORCO)  MG per tablet  Take 1 tablet by mouth Every 6 (Six) Hours As Needed for Moderate Pain .             raNITIdine (ZANTAC) 150 MG tablet  Take 150 mg by mouth Daily.                 Discharge Diet: Regular diet    Activity at Discharge: ambulate    Follow-up Appointments  Dr. Ridley per his orders    Discharge took over 30 min.    JADE Bailey  05/02/18  9:37 AM    Electronically signed by JADE Bailey at 5/2/2018  9:40 AM       Discharge Order     Start     Ordered    05/02/18 0936  Discharge patient  Once     Comments:  F/U my office 2 weeks  Rx on chart   Expected Discharge Date:  05/02/18    Discharge Disposition:  Home or Self Care    Physician of Record:  ROSSANA TREVINO [[id number]]    Physician of Record selection review needed?:  No       Question Answer Comment   Physician of Record ROSSANA TREVINO    Physician of Record selection review needed? No        05/02/18 0936    05/02/18 0934  Discharge patient  Once     Expected Discharge Date:  05/02/18    Discharge Disposition:  Home or Self Care    Physician of Record:  SHANNEN RIDLEY [[id number]]     Physician of Record selection review needed?:  No       Question Answer Comment   Physician of Record SHANNEN HAWKINS    Physician of Record selection review needed? No        05/02/18 0901

## 2024-07-15 ENCOUNTER — TRANSCRIBE ORDERS (OUTPATIENT)
Dept: CT IMAGING | Facility: CLINIC | Age: 54
End: 2024-07-15
Payer: COMMERCIAL

## 2024-07-15 DIAGNOSIS — Z72.0 TOBACCO ABUSE: Primary | ICD-10-CM

## 2024-07-16 ENCOUNTER — TRANSCRIBE ORDERS (OUTPATIENT)
Dept: CARDIOLOGY | Facility: CLINIC | Age: 54
End: 2024-07-16
Payer: COMMERCIAL

## 2024-07-16 DIAGNOSIS — R06.02 SHORTNESS OF BREATH: Primary | ICD-10-CM

## 2024-07-17 ENCOUNTER — OFFICE VISIT (OUTPATIENT)
Dept: CARDIOLOGY | Facility: CLINIC | Age: 54
End: 2024-07-17
Payer: COMMERCIAL

## 2024-07-17 VITALS
HEART RATE: 84 BPM | SYSTOLIC BLOOD PRESSURE: 154 MMHG | BODY MASS INDEX: 31.02 KG/M2 | OXYGEN SATURATION: 99 % | RESPIRATION RATE: 18 BRPM | HEIGHT: 72 IN | WEIGHT: 229 LBS | DIASTOLIC BLOOD PRESSURE: 84 MMHG

## 2024-07-17 DIAGNOSIS — R06.02 SHORTNESS OF BREATH: ICD-10-CM

## 2024-07-17 DIAGNOSIS — R53.82 CHRONIC FATIGUE: Primary | ICD-10-CM

## 2024-07-17 PROCEDURE — 99204 OFFICE O/P NEW MOD 45 MIN: CPT | Performed by: INTERNAL MEDICINE

## 2024-07-17 PROCEDURE — 93000 ELECTROCARDIOGRAM COMPLETE: CPT | Performed by: INTERNAL MEDICINE

## 2024-07-17 RX ORDER — VALSARTAN 160 MG/1
160 TABLET ORAL NIGHTLY
Qty: 30 TABLET | Refills: 1 | Status: SHIPPED | OUTPATIENT
Start: 2024-07-17

## 2024-07-17 NOTE — PROGRESS NOTES
[facility]  [facility] CARDIOLOGY  [address] [place] [address]  Dept: [phone]  Dept Fax: [phone]    Foster Saldivar  1970    New Patient Office Note    History of Present Illness:  Foster Saldivar is a 54 y.o. male who presents to the clinic for Establish Care.Weakness and SOB- male 54 years old with tobacco abuser, no diabetes, no hypertension, was referred to us for fatigue and SOB, feels weak, tired, does not want to do anything, her BP today is 160,80, her cardiac exam is normal, her KG sinus hr 68, he seems feeling overwhelm due to his job, very busy, he has lab with Dr Muñoz all are normal except Ldl is 122;. As he is smoker, male 54 and now Hypertension, will start Crestor 10 mg    The following portions of the patient's history were reviewed and updated as appropriate: allergies, current medications, past family history, past medical history, past social history, past surgical history, and problem list.    Medications:  docusate sodium  HYDROcodone-acetaminophen  raNITIdine  sertraline  valsartan    Subjective  No Known Allergies     Past Medical History:   Diagnosis Date    GERD (gastroesophageal reflux disease)     Heartburn     Neck pain     PONV (postoperative nausea and vomiting)     no sugical hx    Wears glasses \       Past Surgical History:   Procedure Laterality Date    ANTERIOR CERVICAL DISCECTOMY W/ FUSION N/A 5/1/2018    Procedure: CERVICAL DISCECTOMY ANTERIOR WITH FUSION WITH BENGAL CAGE ANTERIOR PLATING C5-7;  Surgeon: Frank Ridley MD;  Location: [facility];  Service: Orthopedic Spine    NO PAST SURGERIES         History reviewed. No pertinent family history.     Social History     Socioeconomic History    Marital status:    Tobacco Use    Smoking status: Every Day     Current packs/day: 0.00     Average packs/day: 1.5 packs/day for 30.0 years (45.0 ttl pk-yrs)     Types: Cigarettes     Start date: 4/27/1988     Last  "attempt to quit: 2018     Years since quittin.2     Passive exposure: Current    Smokeless tobacco: Never   Vaping Use    Vaping status: Former   Substance and Sexual Activity    Alcohol use: Yes     Comment: 3-5 DRINKS, MAYBE 3 DAYS PER WEEK    Drug use: Yes     Types: Marijuana     Comment: OCCASIONAL USE    Sexual activity: Defer       Review of Systems   Constitutional:  Positive for fatigue.   HENT: Negative.     Respiratory:  Positive for shortness of breath.    Cardiovascular: Negative.    Endocrine: Negative.    Genitourinary: Negative.    Musculoskeletal: Negative.    Skin: Negative.    Allergic/Immunologic: Negative.    Neurological: Negative.    Hematological: Negative.    Psychiatric/Behavioral: Negative.         Cardiovascular Procedures    ECHO/MUGA:  STRESS TESTS:   CARDIAC CATH:   DEVICES:   HOLTER:   CT/MRI:   VASCULAR:   CARDIOTHORACIC:     Objective  Vitals:    24 1148   BP: 154/84   BP Location: Right arm   Patient Position: Lying   Cuff Size: Adult   Pulse: 84   Resp: 18   SpO2: 99%   Weight: 104 kg (229 lb)   Height: 182.9 cm (72\")   PainSc: 0-No pain       Physical Exam  Vitals reviewed.   Constitutional:       Appearance: Healthy appearance. Not in distress.   Eyes:      Pupils: Pupils are equal, round, and reactive to light.   HENT:    Mouth/Throat:      Pharynx: Oropharynx is clear.   Neck:      Thyroid: Thyroid normal.      Vascular: No JVR. JVD normal.   Pulmonary:      Effort: Pulmonary effort is normal.      Breath sounds: Normal breath sounds. No wheezing. No rhonchi. No rales.   Chest:      Chest wall: Not tender to palpatation.   Cardiovascular:      PMI at left midclavicular line. Normal rate. Regular rhythm. Normal S1. Normal S2.       Murmurs: There is no murmur.      No gallop.  No click. No rub.   Pulses:     Intact distal pulses.      Carotid: 3+ bilaterally.     Radial: 3+ bilaterally.     Femoral: 3+ bilaterally.     Dorsalis pedis: 3+ bilaterally.     " Posterior tibial: 3+ bilaterally.  Edema:     Peripheral edema absent.   Abdominal:      General: Bowel sounds are normal.      Palpations: Abdomen is soft.      Tenderness: There is no abdominal tenderness.   Musculoskeletal: Normal range of motion.         General: No tenderness.      Cervical back: Normal range of motion and neck supple. Skin:     General: Skin is warm and dry.   Neurological:      General: No focal deficit present.      Mental Status: Alert and oriented to person, place and time.        Diagnostic Data    ECG 12 Lead    Date/Time: 7/17/2024 1:54 PM  Performed by: Clark Palmer MD    Authorized by: Clark Palmer MD  Comparison: compared with previous ECG from 5/1/2018  Similar to previous ECG  Rhythm: sinus rhythm  Rate: normal  BPM: 68  QRS axis: normal    Clinical impression: normal ECG        Assessment and Plan  Diagnoses and all orders for this visit:    Chronic fatigue- No really chest pain, but he at risk for cardiac events due to smoker,     , male and age, he also has elevated BP now 160.80  -     Stress Test With Myocardial Perfusion One Day; Future    Shortness of breath- on exertion, will get a stress nuclear  -     Stress Test With Myocardial Perfusion One Day; Future  Hypertension-= The BP is high 160/80, will start valsartan 160 mg at night time  Hypercholesterolemia - Ldl is 122, will start Crestor 10 mg    Other orders  -     sertraline (ZOLOFT) 50 MG tablet; Take 1 tablet by mouth Daily.  -     valsartan (DIOVAN) 160 MG tablet; Take 1 tablet by mouth Every Night.        Return in about 2 weeks (around 7/31/2024) for Recheck with Dr. Palmer.    Clark Palmer MD  07/17/2024

## 2024-08-13 RX ORDER — AMLODIPINE BESYLATE 5 MG/1
5 TABLET ORAL DAILY
Qty: 90 TABLET | Refills: 3 | Status: SHIPPED | OUTPATIENT
Start: 2024-08-13

## 2024-08-23 ENCOUNTER — TELEPHONE (OUTPATIENT)
Dept: CARDIOLOGY | Facility: CLINIC | Age: 54
End: 2024-08-23
Payer: COMMERCIAL

## 2024-08-23 NOTE — TELEPHONE ENCOUNTER
Hub staff attempted to follow warm transfer process and was unsuccessful     Caller: Foster Saldivar    Relationship to patient: Self    Best call back number: [phone]    Patient is needing: PT CALLED AROUND 8:05AM TO RESCHEDULE HIS STRESS TEST FOR TODAY, UNABLE TO WT. PT IS NOT A NO SHOW IF POSSIBLE, COULDN'T GET IT RESCHEDULED.

## 2024-08-26 ENCOUNTER — OFFICE VISIT (OUTPATIENT)
Dept: CARDIOLOGY | Facility: CLINIC | Age: 54
End: 2024-08-26
Payer: COMMERCIAL

## 2024-08-26 ENCOUNTER — TELEPHONE (OUTPATIENT)
Dept: CARDIOLOGY | Facility: CLINIC | Age: 54
End: 2024-08-26

## 2024-08-26 VITALS
HEART RATE: 68 BPM | OXYGEN SATURATION: 99 % | HEIGHT: 72 IN | RESPIRATION RATE: 18 BRPM | WEIGHT: 231 LBS | BODY MASS INDEX: 31.29 KG/M2 | DIASTOLIC BLOOD PRESSURE: 86 MMHG | SYSTOLIC BLOOD PRESSURE: 154 MMHG

## 2024-08-26 DIAGNOSIS — I25.10 CORONARY ARTERY DISEASE INVOLVING NATIVE CORONARY ARTERY OF NATIVE HEART WITHOUT ANGINA PECTORIS: ICD-10-CM

## 2024-08-26 DIAGNOSIS — I10 ESSENTIAL HYPERTENSION: ICD-10-CM

## 2024-08-26 DIAGNOSIS — Z72.0 TOBACCO ABUSE: Primary | ICD-10-CM

## 2024-08-26 DIAGNOSIS — E78.00 HYPERCHOLESTEROLEMIA: ICD-10-CM

## 2024-08-26 PROCEDURE — 99214 OFFICE O/P EST MOD 30 MIN: CPT | Performed by: INTERNAL MEDICINE

## 2024-08-26 RX ORDER — VALSARTAN 320 MG/1
320 TABLET ORAL NIGHTLY
Qty: 90 TABLET | Refills: 1 | Status: SHIPPED | OUTPATIENT
Start: 2024-08-26

## 2024-08-26 RX ORDER — HYDROCHLOROTHIAZIDE 12.5 MG/1
12.5 CAPSULE ORAL DAILY
Qty: 90 CAPSULE | Refills: 3 | Status: SHIPPED | OUTPATIENT
Start: 2024-08-26

## 2024-08-26 RX ORDER — ROSUVASTATIN CALCIUM 20 MG/1
20 TABLET, COATED ORAL DAILY
Qty: 90 TABLET | Refills: 3 | Status: SHIPPED | OUTPATIENT
Start: 2024-08-26

## 2024-08-26 RX ORDER — ASPIRIN 81 MG/1
81 TABLET ORAL DAILY
Qty: 90 TABLET | Refills: 3 | Status: SHIPPED | OUTPATIENT
Start: 2024-08-26

## 2024-08-26 NOTE — TELEPHONE ENCOUNTER
Pt returned call. Discussed rescheduling stress and follow up. Pt still wanted to come in for their appointment today to discuss bp. States his bp has been running high then low, and repeating. Wants to discuss with Dr Palmer.

## 2024-08-27 NOTE — PROGRESS NOTES
[facility]  [facility] CARDIOLOGY  [address] [place] [address]  Dept: [phone]  Dept Fax: [phone]    Foster Saldivar  1970    Follow Up Office Visit Note    History of Present Illness:  Foster Saldivar is a 54 y.o. male who presents to the clinic for Follow-up.Weakness and Hypertension- His BP is better buit still high 135,70 we started on 160 mg Valsartan then we increase to 320 mg and we added Amlodipine 5mg, still BP  better but high, not at goal will add HCTZ 12,5  he needs to have the stress part which was placed on hold due to uncontrol BP, no abdominal bruit,  no low K, ,  he  will set it up for next week the stress part,  he did have the  Ct chest with calcifications of the coronary arteries so we need to know about the coronary status    The following portions of the patient's history were reviewed and updated as appropriate: allergies, current medications, past family history, past medical history, past social history, past surgical history, and problem list.    Medications:  amLODIPine  aspirin  hydroCHLOROthiazide  rosuvastatin  sertraline  valsartan    Subjective  No Known Allergies     Past Medical History:   Diagnosis Date    GERD (gastroesophageal reflux disease)     Heartburn     Neck pain     PONV (postoperative nausea and vomiting)     no sugical hx    Wears glasses \       Past Surgical History:   Procedure Laterality Date    ANTERIOR CERVICAL DISCECTOMY W/ FUSION N/A 5/1/2018    Procedure: CERVICAL DISCECTOMY ANTERIOR WITH FUSION WITH BENGAL CAGE ANTERIOR PLATING C5-7;  Surgeon: Frank Ridley MD;  Location: [facility];  Service: Orthopedic Spine    NO PAST SURGERIES         History reviewed. No pertinent family history.     Social History     Socioeconomic History    Marital status:    Tobacco Use    Smoking status: Every Day     Current packs/day: 0.00     Average packs/day: 1.5 packs/day for 30.0 years (45.0 ttl pk-yrs)      "Types: Cigarettes     Start date: 1988     Last attempt to quit: 2018     Years since quittin.3     Passive exposure: Current    Smokeless tobacco: Never   Vaping Use    Vaping status: Former   Substance and Sexual Activity    Alcohol use: Yes     Comment: 3-5 DRINKS, MAYBE 3 DAYS PER WEEK    Drug use: Yes     Types: Marijuana     Comment: OCCASIONAL USE    Sexual activity: Defer       Review of Systems   Constitutional: Negative.    HENT: Negative.     Respiratory: Negative.     Cardiovascular: Negative.    Endocrine: Negative.    Genitourinary: Negative.    Musculoskeletal: Negative.    Skin: Negative.    Allergic/Immunologic: Negative.    Neurological: Negative.    Hematological: Negative.    Psychiatric/Behavioral: Negative.       Cardiovascular Procedures    ECHO/MUGA:  STRESS TESTS:   CARDIAC CATH:   DEVICES:   HOLTER:   CT/MRI:   VASCULAR:   CARDIOTHORACIC:     Objective  Vitals:    24 1136   BP: 154/86   BP Location: Right arm   Patient Position: Lying   Cuff Size: Adult   Pulse: 68   Resp: 18   SpO2: 99%   Weight: 105 kg (231 lb)   Height: 182.9 cm (72.01\")   PainSc: 0-No pain     Body mass index is 31.32 kg/m².     Physical Exam  Vitals reviewed.   Constitutional:       Appearance: Healthy appearance. Not in distress.   Eyes:      Pupils: Pupils are equal, round, and reactive to light.   HENT:    Mouth/Throat:      Pharynx: Oropharynx is clear.   Neck:      Thyroid: Thyroid normal.      Vascular: No JVR. JVD normal.   Pulmonary:      Effort: Pulmonary effort is normal.      Breath sounds: Normal breath sounds. No wheezing. No rhonchi. No rales.   Chest:      Chest wall: Not tender to palpatation.   Cardiovascular:      PMI at left midclavicular line. Normal rate. Regular rhythm. Normal S1. Normal S2.       Murmurs: There is no murmur.      No gallop.  No click. No rub.   Pulses:     Intact distal pulses.      Carotid: 3+ bilaterally.     Radial: 3+ bilaterally.     Femoral: 3+ " bilaterally.     Dorsalis pedis: 3+ bilaterally.     Posterior tibial: 3+ bilaterally.  Edema:     Peripheral edema absent.   Abdominal:      General: Bowel sounds are normal.      Palpations: Abdomen is soft.      Tenderness: There is no abdominal tenderness.   Musculoskeletal: Normal range of motion.         General: No tenderness.      Cervical back: Normal range of motion and neck supple. Skin:     General: Skin is warm and dry.   Neurological:      General: No focal deficit present.      Mental Status: Alert and oriented to person, place and time.        Diagnostic Data  Procedures    Assessment and Plan  Diagnoses and all orders for this visit:    Tobacco abuse, quit 4/27/18    Essential hypertension- BP is better 135,80 still elevated, will add HCTZ 12,5 ,keep Amlodipine 5 mg and also Valsartan 320 mg    Hypercholesterolemia- On Crestor 10 mg, LDL was 122,  will increase to 20 mg as he has CAD    Coronary artery disease involving native coronary artery of native heart without angina pectoris- No chest pain but weakness, pending stress nuclear when BP is better control, will start ASA 81 mg    Other orders  -     valsartan (DIOVAN) 320 MG tablet; Take 1 tablet by mouth Every Night.  -     hydroCHLOROthiazide (MICROZIDE) 12.5 MG capsule; Take 1 capsule by mouth Daily.  -     aspirin 81 MG EC tablet; Take 1 tablet by mouth Daily.  -     rosuvastatin (CRESTOR) 20 MG tablet; Take 1 tablet by mouth Daily.         Return in about 1 month (around 9/26/2024) for Recheck with Dr. Palmer.    Clark Palmer MD  08/26/2024

## 2024-09-10 ENCOUNTER — TELEPHONE (OUTPATIENT)
Dept: CARDIOLOGY | Facility: CLINIC | Age: 54
End: 2024-09-10
Payer: COMMERCIAL

## 2024-09-10 NOTE — TELEPHONE ENCOUNTER
Spoke with Mr Saldivar and advised him that his stress test was normal.  He verbalized understanding.

## 2024-10-01 ENCOUNTER — OFFICE VISIT (OUTPATIENT)
Dept: CARDIOLOGY | Facility: CLINIC | Age: 54
End: 2024-10-01
Payer: COMMERCIAL

## 2024-10-01 VITALS
RESPIRATION RATE: 18 BRPM | OXYGEN SATURATION: 99 % | WEIGHT: 238 LBS | BODY MASS INDEX: 32.23 KG/M2 | HEART RATE: 76 BPM | DIASTOLIC BLOOD PRESSURE: 60 MMHG | TEMPERATURE: 97 F | SYSTOLIC BLOOD PRESSURE: 120 MMHG | HEIGHT: 72 IN

## 2024-10-01 DIAGNOSIS — I10 ESSENTIAL HYPERTENSION: ICD-10-CM

## 2024-10-01 DIAGNOSIS — I25.10 CORONARY ARTERY DISEASE INVOLVING NATIVE CORONARY ARTERY OF NATIVE HEART WITHOUT ANGINA PECTORIS: Primary | ICD-10-CM

## 2024-10-01 DIAGNOSIS — E78.00 HYPERCHOLESTEROLEMIA: ICD-10-CM

## 2024-10-01 DIAGNOSIS — Z72.0 TOBACCO ABUSE: ICD-10-CM

## 2024-10-01 PROCEDURE — 99214 OFFICE O/P EST MOD 30 MIN: CPT | Performed by: INTERNAL MEDICINE

## 2024-10-01 RX ORDER — TADALAFIL 20 MG/1
20 TABLET ORAL DAILY PRN
Qty: 10 TABLET | Refills: 3 | Status: SHIPPED | OUTPATIENT
Start: 2024-10-01

## 2024-10-01 NOTE — PROGRESS NOTES
[facility] [place]  [facility] CARDIOLOGY  [address] [place] [address]  Dept: [phone]  Dept Fax: [phone]    Foster Saldivar  1970    Follow Up Office Visit Note    History of Present Illness:  Foster Saldivar is a 54 y.o. male who presents to the clinic for Follow-up. Hypertension- BP is good 120.60 at home is also good, on Valsartan 320 mg, Amlodipine 5 mg and HCTZ 12 .5 daily, his stress nuclear was normal    The following portions of the patient's history were reviewed and updated as appropriate: allergies, current medications, past family history, past medical history, past social history, past surgical history, and problem list.    Medications:  amLODIPine  aspirin  hydroCHLOROthiazide  rosuvastatin  sertraline  tadalafil  valsartan    Subjective  No Known Allergies     Past Medical History:   Diagnosis Date    GERD (gastroesophageal reflux disease)     Heartburn     Neck pain     PONV (postoperative nausea and vomiting)     no sugical hx    Wears glasses \       Past Surgical History:   Procedure Laterality Date    ANTERIOR CERVICAL DISCECTOMY W/ FUSION N/A 2018    Procedure: CERVICAL DISCECTOMY ANTERIOR WITH FUSION WITH BENGAL CAGE ANTERIOR PLATING C5-7;  Surgeon: Frank Ridley MD;  Location: [facility];  Service: Orthopedic Spine    NO PAST SURGERIES         History reviewed. No pertinent family history.     Social History     Socioeconomic History    Marital status:    Tobacco Use    Smoking status: Every Day     Current packs/day: 0.00     Average packs/day: 1.5 packs/day for 30.0 years (45.0 ttl pk-yrs)     Types: Cigarettes     Start date: 1988     Last attempt to quit: 2018     Years since quittin.4     Passive exposure: Current    Smokeless tobacco: Never   Vaping Use    Vaping status: Former   Substance and Sexual Activity    Alcohol use: Yes     Comment: 3-5 DRINKS, MAYBE 3 DAYS PER WEEK    Drug use: Yes     Types:  "Marijuana     Comment: OCCASIONAL USE    Sexual activity: Defer       Review of Systems   Constitutional: Negative.    HENT: Negative.     Respiratory: Negative.     Cardiovascular: Negative.    Endocrine: Negative.    Genitourinary: Negative.    Musculoskeletal: Negative.    Skin: Negative.    Allergic/Immunologic: Negative.    Neurological: Negative.    Hematological: Negative.    Psychiatric/Behavioral: Negative.       Cardiovascular Procedures    ECHO/MUGA:  STRESS TESTS:   CARDIAC CATH:   DEVICES:   HOLTER:   CT/MRI:   VASCULAR:   CARDIOTHORACIC:     Objective  Vitals:    10/01/24 1044 10/01/24 1120   BP: 148/74 120/60   BP Location: Right arm    Patient Position: Lying    Cuff Size: Adult    Pulse: 76    Resp: 18    Temp: 97 °F (36.1 °C)    TempSrc: Infrared    SpO2: 99%    Weight: 108 kg (238 lb)    Height: 182.9 cm (72.01\")    PainSc: 0-No pain      Body mass index is 32.27 kg/m².     Physical Exam  Vitals reviewed.   Constitutional:       Appearance: Healthy appearance. Not in distress.   Eyes:      Pupils: Pupils are equal, round, and reactive to light.   HENT:    Mouth/Throat:      Pharynx: Oropharynx is clear.   Neck:      Thyroid: Thyroid normal.      Vascular: No JVR. JVD normal.   Pulmonary:      Effort: Pulmonary effort is normal.      Breath sounds: Normal breath sounds. No wheezing. No rhonchi. No rales.   Chest:      Chest wall: Not tender to palpatation.   Cardiovascular:      PMI at left midclavicular line. Normal rate. Regular rhythm. Normal S1. Normal S2.       Murmurs: There is no murmur.      No gallop.  No click. No rub.   Pulses:     Intact distal pulses.      Carotid: 3+ bilaterally.     Radial: 3+ bilaterally.     Femoral: 3+ bilaterally.     Dorsalis pedis: 3+ bilaterally.     Posterior tibial: 3+ bilaterally.  Edema:     Peripheral edema absent.   Abdominal:      General: Bowel sounds are normal.      Palpations: Abdomen is soft.      Tenderness: There is no abdominal tenderness. "   Musculoskeletal: Normal range of motion.         General: No tenderness.      Cervical back: Normal range of motion and neck supple. Skin:     General: Skin is warm and dry.   Neurological:      General: No focal deficit present.      Mental Status: Alert and oriented to person, place and time.        Diagnostic Data  Procedures    Assessment and Plan  Diagnoses and all orders for this visit:    Coronary artery disease involving native coronary artery of native heart without angina pectoris- No chest pain, CT calcifications, stress nuclear normal, on ASA 81 mg, has quit smoking    Essential hypertension- BP is good 120.660, will keep same meds Amlodipine 5mg, HCTZ 12,5 and valsartan 320 mg    Hypercholesterolemia- On Crestor 20 mg tolerates well    Tobacco abuse, quit 4/27/18-     Other orders  -     tadalafil (Cialis) 20 MG tablet; Take 1 tablet by mouth Daily As Needed for Erectile Dysfunction.         Return in about 4 months (around 2/1/2025) for Recheck with Dr. Pamler.    Clark Palmer MD  10/01/2024

## 2025-01-30 ENCOUNTER — OFFICE VISIT (OUTPATIENT)
Dept: CARDIOLOGY | Facility: CLINIC | Age: 55
End: 2025-01-30
Payer: COMMERCIAL

## 2025-01-30 VITALS
RESPIRATION RATE: 18 BRPM | WEIGHT: 230 LBS | BODY MASS INDEX: 31.15 KG/M2 | SYSTOLIC BLOOD PRESSURE: 124 MMHG | HEART RATE: 82 BPM | OXYGEN SATURATION: 96 % | DIASTOLIC BLOOD PRESSURE: 64 MMHG | TEMPERATURE: 98 F | HEIGHT: 72 IN

## 2025-01-30 DIAGNOSIS — I25.10 CORONARY ARTERY DISEASE INVOLVING NATIVE CORONARY ARTERY OF NATIVE HEART WITHOUT ANGINA PECTORIS: Primary | ICD-10-CM

## 2025-01-30 DIAGNOSIS — R53.82 CHRONIC FATIGUE: ICD-10-CM

## 2025-01-30 DIAGNOSIS — Z12.5 ENCOUNTER FOR SCREENING PROSTATE SPECIFIC ANTIGEN (PSA) MEASUREMENT: ICD-10-CM

## 2025-01-30 DIAGNOSIS — I10 ESSENTIAL HYPERTENSION: ICD-10-CM

## 2025-01-30 DIAGNOSIS — E78.00 HYPERCHOLESTEROLEMIA: ICD-10-CM

## 2025-01-30 PROBLEM — Z72.0 TOBACCO ABUSE: Status: RESOLVED | Noted: 2018-05-01 | Resolved: 2025-01-30

## 2025-01-30 PROCEDURE — 99214 OFFICE O/P EST MOD 30 MIN: CPT | Performed by: INTERNAL MEDICINE

## 2025-01-30 RX ORDER — VALSARTAN 320 MG/1
320 TABLET ORAL NIGHTLY
Qty: 90 TABLET | Refills: 3 | Status: SHIPPED | OUTPATIENT
Start: 2025-01-30

## 2025-01-30 RX ORDER — SILDENAFIL 100 MG/1
100 TABLET, FILM COATED ORAL DAILY PRN
Qty: 10 TABLET | Refills: 3 | Status: SHIPPED | OUTPATIENT
Start: 2025-01-30

## 2025-01-30 RX ORDER — ROSUVASTATIN CALCIUM 20 MG/1
20 TABLET, COATED ORAL DAILY
Qty: 90 TABLET | Refills: 3 | Status: SHIPPED | OUTPATIENT
Start: 2025-01-30

## 2025-01-30 RX ORDER — ASPIRIN 81 MG/1
81 TABLET ORAL DAILY
Qty: 90 TABLET | Refills: 3 | Status: SHIPPED | OUTPATIENT
Start: 2025-01-30

## 2025-01-30 RX ORDER — AMLODIPINE BESYLATE 5 MG/1
5 TABLET ORAL DAILY
Qty: 90 TABLET | Refills: 3 | Status: SHIPPED | OUTPATIENT
Start: 2025-01-30

## 2025-01-30 RX ORDER — HYDROCHLOROTHIAZIDE 12.5 MG/1
12.5 CAPSULE ORAL DAILY
Qty: 90 CAPSULE | Refills: 3 | Status: SHIPPED | OUTPATIENT
Start: 2025-01-30

## 2025-01-31 ENCOUNTER — CLINICAL SUPPORT (OUTPATIENT)
Dept: CARDIOLOGY | Facility: CLINIC | Age: 55
End: 2025-01-31
Payer: COMMERCIAL

## 2025-01-31 NOTE — PROGRESS NOTES
[facility] [place]  [facility] CARDIOLOGY  [address] [place] [address]  Dept: [phone]  Dept Fax: [phone]    Foster Saldivar  1970    Follow Up Office Visit Note    History of Present Illness:  Foster Saldivar is a 54 y.o. male who presents to the clinic for Follow-up.-CAD- Calcifications by CT, stress nuclear normal on ASA, BP is good 130.80 on Valsartan 320 mg, Amlodipine 5mg and also HCTZ 12,5  no major complaints,    The following portions of the patient's history were reviewed and updated as appropriate: allergies, current medications, past family history, past medical history, past social history, past surgical history, and problem list.    Medications:  amLODIPine  aspirin  hydroCHLOROthiazide  rosuvastatin  sertraline  sildenafil  valsartan    Subjective  No Known Allergies     Past Medical History:   Diagnosis Date    GERD (gastroesophageal reflux disease)     Heartburn     Neck pain     PONV (postoperative nausea and vomiting)     no sugical hx    Wears glasses \       Past Surgical History:   Procedure Laterality Date    ANTERIOR CERVICAL DISCECTOMY W/ FUSION N/A 2018    Procedure: CERVICAL DISCECTOMY ANTERIOR WITH FUSION WITH BENGAL CAGE ANTERIOR PLATING C5-7;  Surgeon: Frank Ridley MD;  Location: [facility];  Service: Orthopedic Spine    NO PAST SURGERIES         History reviewed. No pertinent family history.     Social History     Socioeconomic History    Marital status:    Tobacco Use    Smoking status: Every Day     Current packs/day: 0.00     Average packs/day: 1.5 packs/day for 30.0 years (45.0 ttl pk-yrs)     Types: Cigarettes     Start date: 1988     Last attempt to quit: 2018     Years since quittin.7     Passive exposure: Current    Smokeless tobacco: Never   Vaping Use    Vaping status: Former   Substance and Sexual Activity    Alcohol use: Yes     Comment: 3-5 DRINKS, MAYBE 3 DAYS PER WEEK    Drug use: Yes      "Types: Marijuana     Comment: OCCASIONAL USE    Sexual activity: Defer       Review of Systems    Cardiovascular Procedures    ECHO/MUGA:  STRESS TESTS:   CARDIAC CATH:   DEVICES:   HOLTER:   CT/MRI:   VASCULAR:   CARDIOTHORACIC:     Objective  Vitals:    01/30/25 1027   BP: 124/64   BP Location: Right arm   Patient Position: Lying   Cuff Size: Adult   Pulse: 82   Resp: 18   Temp: 98 °F (36.7 °C)   TempSrc: Infrared   SpO2: 96%   Weight: 104 kg (230 lb)   Height: 182.9 cm (72.01\")   PainSc: 0-No pain     Body mass index is 31.18 kg/m².     Physical Exam  Physical Exam     Diagnostic Data  Procedures    Assessment and Plan  Diagnoses and all orders for this visit:    Coronary artery disease involving native coronary artery of native heart without angina pectoris- No major complaints, stress nuclear normal, has calcifications of the coronarya rteries by CT- No chest pain  -     CBC & Differential  -     Cancel: Comprehensive Metabolic Panel  -     Comprehensive Metabolic Panel; Future    Essential hypertension- BP is 130.80, on Valsartan 320 mg, Amlodipine 5mg and HCTZ 12,5  -     Cancel: Lipid Panel  -     Ambulatory Referral to Family Practice  -     Lipid Panel; Future    Hypercholesterolemia- On Crestor 20 mg, we need  lipid profile, tolerates well  -     Cancel: CK  -     Cancel: Lipid Panel  -     Cancel: High Sensitivity CRP  -     Cancel: Sedimentation Rate  -     Cancel: Vitamin B12; Future  -     Cancel: Vitamin D 25 Hydroxy; Future  -     Ambulatory Referral to Family Practice  -     CK; Future  -     High Sensitivity CRP; Future  -     Lipid Panel; Future  -     Sedimentation Rate; Future  -     Vitamin B12; Future  -     Vitamin D 25 Hydroxy; Future    Chronic fatigue  -     Apolipoprotein B  -     Cancel: Lipoprotein A (LPA)  -     Ambulatory Referral to Family Practice  -     Lipoprotein A (LPA); Future    Encounter for screening prostate specific antigen (PSA) measurement  -     PSA Screen; " Future    Other orders  -     amLODIPine (NORVASC) 5 MG tablet; Take 1 tablet by mouth Daily.  -     aspirin 81 MG EC tablet; Take 1 tablet by mouth Daily.  -     hydroCHLOROthiazide (MICROZIDE) 12.5 MG capsule; Take 1 capsule by mouth Daily.  -     rosuvastatin (CRESTOR) 20 MG tablet; Take 1 tablet by mouth Daily.  -     valsartan (DIOVAN) 320 MG tablet; Take 1 tablet by mouth Every Night.  -     sildenafil (Viagra) 100 MG tablet; Take 1 tablet by mouth Daily As Needed for Erectile Dysfunction.         Return in about 6 months (around 7/30/2025) for Recheck with Dr. Palmer.    Clark Palmer MD  01/30/2025

## 2025-01-31 NOTE — PROGRESS NOTES
Venipuncture Blood Specimen Collection  Venipuncture performed in clinic by Fadia Arellano MA with good hemostasis. Patient tolerated the procedure well without complications.   01/31/25   Fadia Arellano MA

## 2025-02-02 LAB — APO B SERPL-MCNC: 99 MG/DL

## 2025-02-03 ENCOUNTER — TELEPHONE (OUTPATIENT)
Dept: CARDIOLOGY | Facility: CLINIC | Age: 55
End: 2025-02-03
Payer: COMMERCIAL

## 2025-02-03 ENCOUNTER — CLINICAL SUPPORT (OUTPATIENT)
Dept: CARDIOLOGY | Facility: CLINIC | Age: 55
End: 2025-02-03
Payer: COMMERCIAL

## 2025-02-03 ENCOUNTER — TELEPHONE (OUTPATIENT)
Dept: CARDIOLOGY | Facility: CLINIC | Age: 55
End: 2025-02-03

## 2025-02-03 DIAGNOSIS — E87.5 SERUM POTASSIUM ELEVATED: Primary | ICD-10-CM

## 2025-02-03 DIAGNOSIS — R53.83 FATIGUE, UNSPECIFIED TYPE: ICD-10-CM

## 2025-02-03 LAB
25(OH)D3+25(OH)D2 SERPL-MCNC: 23.1 NG/ML (ref 30–100)
ALBUMIN SERPL-MCNC: 4.6 G/DL (ref 3.8–4.9)
ALP SERPL-CCNC: 112 IU/L (ref 44–121)
ALT SERPL-CCNC: 21 IU/L (ref 0–44)
AST SERPL-CCNC: 22 IU/L (ref 0–40)
BASOPHILS # BLD AUTO: NORMAL 10*3/UL
BILIRUB SERPL-MCNC: 0.3 MG/DL (ref 0–1.2)
BUN SERPL-MCNC: 16 MG/DL (ref 6–24)
BUN/CREAT SERPL: 16 (ref 9–20)
CALCIUM SERPL-MCNC: 10.1 MG/DL (ref 8.7–10.2)
CHLORIDE SERPL-SCNC: 102 MMOL/L (ref 96–106)
CHOLEST SERPL-MCNC: 186 MG/DL (ref 100–199)
CK SERPL-CCNC: 68 U/L (ref 41–331)
CO2 SERPL-SCNC: 19 MMOL/L (ref 20–29)
CREAT SERPL-MCNC: 1.03 MG/DL (ref 0.76–1.27)
CRP SERPL HS-MCNC: 17.77 MG/L (ref 0–3)
EGFRCR SERPLBLD CKD-EPI 2021: 85 ML/MIN/1.73
EOSINOPHIL # BLD AUTO: NORMAL 10*3/UL
EOSINOPHIL NFR BLD AUTO: NORMAL %
GLOBULIN SER CALC-MCNC: 2.7 G/DL (ref 1.5–4.5)
GLUCOSE SERPL-MCNC: 101 MG/DL (ref 70–99)
HCT VFR BLD AUTO: NORMAL %
HDLC SERPL-MCNC: 54 MG/DL
HGB BLD-MCNC: NORMAL G/DL
LDLC SERPL CALC-MCNC: 117 MG/DL (ref 0–99)
LPA SERPL-SCNC: 125.4 NMOL/L
LYMPHOCYTES # BLD AUTO: NORMAL 10*3/UL
LYMPHOCYTES NFR BLD AUTO: NORMAL %
MONOCYTES NFR BLD AUTO: NORMAL %
NEUTROPHILS NFR BLD AUTO: NORMAL %
PLATELET # BLD AUTO: NORMAL 10*3/UL
POTASSIUM SERPL-SCNC: 5.7 MMOL/L (ref 3.5–5.2)
PROT SERPL-MCNC: 7.3 G/DL (ref 6–8.5)
PSA SERPL-MCNC: 1.1 NG/ML (ref 0–4)
RBC # BLD AUTO: NORMAL 10*6/UL
SODIUM SERPL-SCNC: 142 MMOL/L (ref 134–144)
SPECIMEN STATUS: NORMAL
TRIGL SERPL-MCNC: 84 MG/DL (ref 0–149)
VIT B12 SERPL-MCNC: 295 PG/ML (ref 232–1245)
VLDLC SERPL CALC-MCNC: 15 MG/DL (ref 5–40)
WBC # BLD AUTO: NORMAL X10E3/UL

## 2025-02-03 PROCEDURE — 36415 COLL VENOUS BLD VENIPUNCTURE: CPT | Performed by: INTERNAL MEDICINE

## 2025-02-03 NOTE — PROGRESS NOTES
Venipuncture Blood Specimen Collection  Venipuncture performed in clinic by Ely García RN with good hemostasis. Patient tolerated the procedure well without complications.   02/03/25   Ely García RN

## 2025-02-03 NOTE — TELEPHONE ENCOUNTER
Spoke with spouse,Stacey, per pt request and went over all lab results and explained the LPA/ABO/Lipids results and starting him on repatha and getting his children checked as well.  She verbalized understanding.  Today we are rechecking his K+ level due to it being elevated at last draw before making decision on medication changes.  Advised her I left a copay card for the repatha to try at pharmacy.

## 2025-02-03 NOTE — PROGRESS NOTES
Repatha sureclick     Patient understands Repatha is to help lower cholesterol and they will give themselves an injection every 14 days.   Patient able to verbalize Repatha needs to be refrigerated and they should take out of refrigerator about 20-30 minutes prior to injection.  Patient understands to look into the medication window and should not be cloudy or see anything floating.    Verbalize that the injection can be given in thighs, near umbilicus, and back of upper arms.    Patient demonstrated cleaning the site with alcohol self-injected and disposed of pen appropriately.    Pt understands the side effects to watch for and understands if any swelling of face, tongue, or difficulty breathing to call 911.      ECG 12 Lead    Date/Time: 2/5/2025 7:41 PM  Performed by: Clark Palmer MD    Authorized by: Clark Palmer MD  Comparison: compared with previous ECG from 7/17/2024  Similar to previous ECG  Rhythm: sinus rhythm  Rate: normal  BPM: 61  QRS axis: normal    Clinical impression: normal ECG

## 2025-02-03 NOTE — TELEPHONE ENCOUNTER
----- Message from Clark Palmer sent at 2/3/2025 11:59 AM EST -----  He will come to have potasium recheck, we will sent repatha for Lpa and high lipids

## 2025-02-06 ENCOUNTER — TELEPHONE (OUTPATIENT)
Dept: CARDIOLOGY | Facility: CLINIC | Age: 55
End: 2025-02-06
Payer: COMMERCIAL

## 2025-02-06 LAB
POTASSIUM SERPL-SCNC: 4.2 MMOL/L (ref 3.5–5.2)
TESTOST FREE SERPL-MCNC: 6.4 PG/ML (ref 7.2–24)
TESTOST SERPL-MCNC: 460 NG/DL (ref 264–916)

## 2025-02-06 NOTE — TELEPHONE ENCOUNTER
Patient notified of  results, patient verbalized understanding.   Pt would like to know if  can call medication in for low testosterone. Please advise?

## 2025-02-06 NOTE — TELEPHONE ENCOUNTER
----- Message from Ely MURRAY sent at 2/6/2025  4:07 PM EST -----    ----- Message -----  From: Clark Palmer MD  Sent: 2/6/2025   1:24 PM EST  To: Ely García RN    K is normal testorenone seems low,

## 2025-02-06 NOTE — TELEPHONE ENCOUNTER
Spoke with Mr Yariel and advised him that we shaila the testosterone level per he request and as a favor, however, Dr Palmer does not prescribe.  He states the referral that Dr Palmer placed for a new PCP didn't work out. They called him and advised him that Dr. Murray is not taking new patients at this time.  Advised I would look into this matter and call him back.

## 2025-02-07 NOTE — TELEPHONE ENCOUNTER
Spoke with  Saldivar and advised him that Dr Palmer spoke with Dr Carnes and he has agreed to take him on as a patient.  Call [phone] and advised them that he was referred by dr. Palmer and dr. Carnes excepted.

## 2025-02-10 RX ORDER — VALSARTAN 320 MG/1
320 TABLET ORAL
Qty: 90 TABLET | Refills: 1 | Status: SHIPPED | OUTPATIENT
Start: 2025-02-10

## 2025-02-17 ENCOUNTER — PATIENT ROUNDING (BHMG ONLY) (OUTPATIENT)
Dept: FAMILY MEDICINE CLINIC | Facility: CLINIC | Age: 55
End: 2025-02-17
Payer: COMMERCIAL

## 2025-02-17 ENCOUNTER — OFFICE VISIT (OUTPATIENT)
Dept: FAMILY MEDICINE CLINIC | Facility: CLINIC | Age: 55
End: 2025-02-17
Payer: COMMERCIAL

## 2025-02-17 VITALS
HEART RATE: 68 BPM | HEIGHT: 72 IN | DIASTOLIC BLOOD PRESSURE: 70 MMHG | BODY MASS INDEX: 31.63 KG/M2 | RESPIRATION RATE: 14 BRPM | WEIGHT: 233.5 LBS | SYSTOLIC BLOOD PRESSURE: 128 MMHG | OXYGEN SATURATION: 96 %

## 2025-02-17 DIAGNOSIS — N40.1 BENIGN PROSTATIC HYPERPLASIA WITH WEAK URINARY STREAM: ICD-10-CM

## 2025-02-17 DIAGNOSIS — Z72.0 TOBACCO ABUSE: ICD-10-CM

## 2025-02-17 DIAGNOSIS — N52.9 ERECTILE DYSFUNCTION, UNSPECIFIED ERECTILE DYSFUNCTION TYPE: ICD-10-CM

## 2025-02-17 DIAGNOSIS — R39.12 BENIGN PROSTATIC HYPERPLASIA WITH WEAK URINARY STREAM: ICD-10-CM

## 2025-02-17 DIAGNOSIS — E53.8 VITAMIN B 12 DEFICIENCY: Primary | ICD-10-CM

## 2025-02-17 DIAGNOSIS — R53.83 FATIGUE, UNSPECIFIED TYPE: ICD-10-CM

## 2025-02-17 DIAGNOSIS — I10 ESSENTIAL HYPERTENSION: ICD-10-CM

## 2025-02-17 PROCEDURE — 90656 IIV3 VACC NO PRSV 0.5 ML IM: CPT | Performed by: FAMILY MEDICINE

## 2025-02-17 PROCEDURE — 99204 OFFICE O/P NEW MOD 45 MIN: CPT | Performed by: FAMILY MEDICINE

## 2025-02-17 PROCEDURE — 90471 IMMUNIZATION ADMIN: CPT | Performed by: FAMILY MEDICINE

## 2025-02-17 RX ORDER — CYANOCOBALAMIN 1000 UG/ML
1000 INJECTION, SOLUTION INTRAMUSCULAR; SUBCUTANEOUS
Status: SHIPPED | OUTPATIENT
Start: 2025-02-17

## 2025-02-17 RX ADMIN — CYANOCOBALAMIN 1000 MCG: 1000 INJECTION, SOLUTION INTRAMUSCULAR; SUBCUTANEOUS at 15:47

## 2025-02-17 NOTE — PROGRESS NOTES
New Patient Office Visit      Patient Name: Foster Saldivar  : 1970   MRN: [id number]     Chief Complaint:    Chief Complaint   Patient presents with   • Establish Care              History of Present Illness: Foster Saldivar is a 55 y.o. male who is here today to to establish care with our practice formally was seen by Dr. Esteban and is currently being followed by cardiology, Dr. Palmer    History of Present Illness  The patient is a 55-year-old male who presents as a new patient to establish care.    He has been experiencing elevated blood pressure, which is being managed by his cardiologist with medication. He was previously under the care of Dr. Muñoz but had to discontinue due to practice by out. His cardiologist recommended our practice. He reports recent labs has been informed of low testosterone levels. He has a history of kidney issues and erectile dysfunction, for which he has been prescribed Viagra and Cialis, but these medications have not been effective. 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 He has been on sertraline for approximately one year, initially at a low dose, which was later increased. He reports easy irritability, particularly with his work crew.     He had a stress nuclear test which was negative but he has been informed of a potential minor blockage in his heart and is currently on baby aspirin. He recently started Repatha injections every two weeks and is also taking rosuvastatin 20 mg for cholesterol management. He has quit smoking for the past 3 to 4 months but recently resumed. He reports no tenderness or swelling in his thyroid.  He still struggles with the ED which is one of his main complaints and general fatigue-- he reports no new lumps or bumps on his penis or testicles. He reports increased urination at night, approximately twice per night, but no weak stream or difficulty with  urination.    SOCIAL HISTORY  He quit smoking three or four months ago but started again about a month ago.    MEDICATIONS  Current: Baby aspirin, Repatha, rosuvastatin, sertraline, valsartan, amlodipine, Viagra, hydrochlorothiazide  Review of Systems   Constitutional: Positive for fatigue and no fever.   Respiratory: Negative for cough and shortness of breath.    Cardiovascular: Negative for chest pain and palpitations.   Skin: Negative for rash or itching      Subjective      Review of Systems:   Review of Systems    Past Medical History:   Past Medical History:   Diagnosis Date   • GERD (gastroesophageal reflux disease)    • Heartburn    • Neck pain    • PONV (postoperative nausea and vomiting)     no sugical hx   • Wears glasses \       Past Surgical History:   Past Surgical History:   Procedure Laterality Date   • ANTERIOR CERVICAL DISCECTOMY W/ FUSION N/A 2018    Procedure: CERVICAL DISCECTOMY ANTERIOR WITH FUSION WITH BENGAL CAGE ANTERIOR PLATING C5-7;  Surgeon: Frank Ridley MD;  Location: [facility];  Service: Orthopedic Spine   • NO PAST SURGERIES         Family History: History reviewed. No pertinent family history.    Social History:   Social History     Socioeconomic History   • Marital status:    Tobacco Use   • Smoking status: Some Days     Current packs/day: 0.00     Average packs/day: 1.5 packs/day for 30.0 years (45.0 ttl pk-yrs)     Types: Cigarettes     Start date: 1988     Last attempt to quit: 2018     Years since quittin.8     Passive exposure: Current   • Smokeless tobacco: Never   Vaping Use   • Vaping status: Never Used   Substance and Sexual Activity   • Alcohol use: Not Currently     Comment: 3-5 DRINKS, MAYBE 3 DAYS PER WEEK   • Drug use: Yes     Types: Marijuana     Comment: OCCASIONAL USE   • Sexual activity: Not Currently     Partners: Female       Medications:     Current Outpatient Medications:   •  amLODIPine (NORVASC) 5 MG tablet, Take 1 tablet by  "mouth Daily., Disp: 90 tablet, Rfl: 3  •  aspirin 81 MG EC tablet, Take 1 tablet by mouth Daily., Disp: 90 tablet, Rfl: 3  •  Evolocumab (REPATHA) solution auto-injector SureClick injection, Inject 1 mL under the skin into the appropriate area as directed Every 14 (Fourteen) Days., Disp: 6 mL, Rfl: 3  •  hydroCHLOROthiazide (MICROZIDE) 12.5 MG capsule, Take 1 capsule by mouth Daily., Disp: 90 capsule, Rfl: 3  •  rosuvastatin (CRESTOR) 20 MG tablet, Take 1 tablet by mouth Daily., Disp: 90 tablet, Rfl: 3  •  sertraline (ZOLOFT) 50 MG tablet, Take 1 tablet by mouth Daily., Disp: , Rfl:   •  sildenafil (Viagra) 100 MG tablet, Take 1 tablet by mouth Daily As Needed for Erectile Dysfunction., Disp: 10 tablet, Rfl: 3  •  valsartan (DIOVAN) 320 MG tablet, TAKE ONE TABLET BY MOUTH EVERY night, Disp: 90 tablet, Rfl: 1    Current Facility-Administered Medications:   •  cyanocobalamin injection 1,000 mcg, 1,000 mcg, Intramuscular, Q28 Days, Chad Carnes MD    Allergies:   No Known Allergies    Objective     Physical Exam:  Vital Signs:   Vitals:    02/17/25 1446   BP: 128/70   Pulse: 68   Resp: 14   SpO2: 96%   Weight: 106 kg (233 lb 8 oz)   Height: 182.9 cm (72\")   PainSc: 0-No pain     Body mass index is 31.67 kg/m².   Facility age limit for growth %sapna is 20 years.  BMI is >= 30 and <35. (Class 1 Obesity). The following options were offered after discussion;: exercise counseling/recommendations and nutrition counseling/recommendations       Physical Exam  Vitals and nursing note reviewed.   Constitutional:       Appearance: Normal appearance.   HENT:      Head: Normocephalic and atraumatic.      Nose: Nose normal.   Neck:      Comments: No thyromegaly or masses  Cardiovascular:      Rate and Rhythm: Normal rate and regular rhythm.   Pulmonary:      Effort: Pulmonary effort is normal.      Breath sounds: Normal breath sounds.   Musculoskeletal:         General: Normal range of motion.      Cervical back: Normal range of " motion and neck supple.      Right lower leg: No edema.      Left lower leg: No edema.   Lymphadenopathy:      Cervical: No cervical adenopathy.   Skin:     General: Skin is warm and dry.   Neurological:      General: No focal deficit present.      Mental Status: He is alert.   Psychiatric:         Mood and Affect: Mood normal.         Behavior: Behavior normal.         Procedures    PHQ-9 Total Score:        Assessment / Plan      Assessment/Plan:   Diagnoses and all orders for this visit:    1. Vitamin B 12 deficiency (Primary)  -     cyanocobalamin injection 1,000 mcg    2. Fatigue, unspecified type  -     TSH; Future  -     CBC Auto Differential; Future  -     CBC Auto Differential  -     TSH    3. Erectile dysfunction, unspecified erectile dysfunction type  -     Ambulatory Referral to Urology    4. Benign prostatic hyperplasia with weak urinary stream  -     Ambulatory Referral to Urology    5. Essential hypertension    6. Tobacco abuse, quit 4/27/18       Old labs reviewed B12 level low normal  Testosterone level good but free component slightly low  Assessment & Plan  1. Elevated blood pressure.  His blood pressure increased to 204/76 during the stress test. He is currently on valsartan 320 mg, amlodipine, and hydrochlorothiazide. He will continue his current medication regimen.  Continue follow-ups with cardiology as directed    2. Hyperlipidemia.  His LDL cholesterol level is 117. He is on rosuvastatin 20 mg and Repatha injections every 2 weeks. He is advised to continue his current medication regimen and aim to lower his LDL to below 70.    3. Erectile dysfunction.  He is currently on Viagra 100 mg, which he takes 30 minutes to 3 hours before intercourse. The potential side effects of sertraline, including delayed ejaculation and decreased libido, were discussed. A referral to a urologist will be made for further evaluation and management of his erectile dysfunction and urinary symptoms.    4. Anxiety and  depression.  He is on sertraline, which has been helping according to his wife. He will continue his current dosage.    5. Fatigue.  His vitamin B12 level is within the normal range at 295, but optimal levels are above 400. A B12 injection will be administered today, and he is advised to take over-the-counter vitamin B12 1000 mcg daily for 3 months. A thyroid function test and complete blood count (CBC) will be ordered to rule out other causes of fatigue.    Follow-up  The patient will follow up in 6 weeks.    Will get flu shot today as well as B12 1 cc IM and then he can follow-up with the 1000 mcg/day of vitamin B12 over-the-counter for 3 months  He is back smoking now which could contribute to atherosclerosis and some of his symptoms    Encouraged continue with lipid-lowering medicines which will help reduce risks of heart attack stroke and may improve other vascular diseases also.    Follow Up:   Return in about 6 weeks (around 3/31/2025) for Recheck.        Patient or patient representative verbalized consent for the use of Ambient Listening during the visit with  Chad Carnes MD for chart documentation. 2/17/2025  15:40 EST    Chad Carnes MD  [facility]   Portions of note created with Dragon voice recognition technology

## 2025-02-18 LAB
BASOPHILS # BLD AUTO: 0.1 X10E3/UL (ref 0–0.2)
BASOPHILS NFR BLD AUTO: 1 %
EOSINOPHIL # BLD AUTO: 0.3 X10E3/UL (ref 0–0.4)
EOSINOPHIL NFR BLD AUTO: 3 %
ERYTHROCYTE [DISTWIDTH] IN BLOOD BY AUTOMATED COUNT: 12.2 % (ref 11.6–15.4)
HCT VFR BLD AUTO: 46.3 % (ref 37.5–51)
HGB BLD-MCNC: 16.2 G/DL (ref 13–17.7)
IMM GRANULOCYTES # BLD AUTO: 0 X10E3/UL (ref 0–0.1)
IMM GRANULOCYTES NFR BLD AUTO: 0 %
LYMPHOCYTES # BLD AUTO: 2.8 X10E3/UL (ref 0.7–3.1)
LYMPHOCYTES NFR BLD AUTO: 27 %
MCH RBC QN AUTO: 33.6 PG (ref 26.6–33)
MCHC RBC AUTO-ENTMCNC: 35 G/DL (ref 31.5–35.7)
MCV RBC AUTO: 96 FL (ref 79–97)
MONOCYTES # BLD AUTO: 0.9 X10E3/UL (ref 0.1–0.9)
MONOCYTES NFR BLD AUTO: 9 %
NEUTROPHILS # BLD AUTO: 6.1 X10E3/UL (ref 1.4–7)
NEUTROPHILS NFR BLD AUTO: 60 %
PLATELET # BLD AUTO: 321 X10E3/UL (ref 150–450)
RBC # BLD AUTO: 4.82 X10E6/UL (ref 4.14–5.8)
TSH SERPL DL<=0.005 MIU/L-ACNC: 0.86 UIU/ML (ref 0.45–4.5)
WBC # BLD AUTO: 10.3 X10E3/UL (ref 3.4–10.8)

## 2025-02-21 NOTE — PROGRESS NOTES
February 21, 2025    Hello, may I speak with Foster Saldivar?    My name is NURA ALEX       I am  with [facility]  [address] [address] [phone].    Before we get started may I verify your date of birth? 1970    I am calling to officially welcome you to our practice and ask about your recent visit. Is this a good time to talk? yes    Tell me about your visit with us. What things went well?  DR. SANTO IS GREAT. HE WASN'T TAKING NEW PATIENTS BUT AGREED TO SEE ME DUE TO MY CARDIOLOGIST. I REALLY APPRECIATED THAT. ALL THE OTHER WORKERS THERE WERE NOTHING BUT FRIENDLY. THE FRONT OFFICE GREETED ME SOON AS A WALKED IN THE DOOR. AND WHEN I WAS LEAVING. THAT WAS NICE. EVERYONE IN OFFICE SEEMED REAL HAPPY .        We're always looking for ways to make our patients' experiences even better. Do you have recommendations on ways we may improve?  no    Overall were you satisfied with your first visit to our practice? yes       I appreciate you taking the time to speak with me today. Is there anything else I can do for you? no      Thank you, and have a great day.

## 2025-03-27 ENCOUNTER — HOSPITAL ENCOUNTER (EMERGENCY)
Facility: HOSPITAL | Age: 55
Discharge: HOME | End: 2025-03-27
Attending: EMERGENCY MEDICINE
Payer: COMMERCIAL

## 2025-03-27 VITALS
WEIGHT: 225 LBS | RESPIRATION RATE: 18 BRPM | SYSTOLIC BLOOD PRESSURE: 160 MMHG | DIASTOLIC BLOOD PRESSURE: 91 MMHG | BODY MASS INDEX: 30.48 KG/M2 | HEART RATE: 86 BPM | HEIGHT: 72 IN | OXYGEN SATURATION: 97 % | TEMPERATURE: 97.9 F

## 2025-03-27 DIAGNOSIS — N48.30 PRIAPISM: Primary | ICD-10-CM

## 2025-03-27 PROBLEM — N48.39 ISCHEMIC PRIAPISM: Status: ACTIVE | Noted: 2025-03-27

## 2025-03-27 PROCEDURE — 25010000002 PHENYLEPHRINE 10 MG/ML SOLUTION: Performed by: PHYSICIAN ASSISTANT

## 2025-03-27 PROCEDURE — 99285 EMERGENCY DEPT VISIT HI MDM: CPT

## 2025-03-27 PROCEDURE — 54220 IRRG CRPRA CAVRNOSA PRIAPISM: CPT | Performed by: STUDENT IN AN ORGANIZED HEALTH CARE EDUCATION/TRAINING PROGRAM

## 2025-03-27 PROCEDURE — 99222 1ST HOSP IP/OBS MODERATE 55: CPT | Performed by: STUDENT IN AN ORGANIZED HEALTH CARE EDUCATION/TRAINING PROGRAM

## 2025-03-27 PROCEDURE — 25010000002 PHENYLEPHRINE 10 MG/ML SOLUTION: Performed by: EMERGENCY MEDICINE

## 2025-03-27 RX ORDER — SULFAMETHOXAZOLE AND TRIMETHOPRIM 800; 160 MG/1; MG/1
1 TABLET ORAL 2 TIMES DAILY
Qty: 14 TABLET | Refills: 0 | Status: SHIPPED | OUTPATIENT
Start: 2025-03-27 | End: 2025-04-03

## 2025-03-27 RX ADMIN — SODIUM CHLORIDE 500 MCG: 9 INJECTION INTRAMUSCULAR; INTRAVENOUS; SUBCUTANEOUS at 15:23

## 2025-03-27 RX ADMIN — SODIUM CHLORIDE 500 MCG: 9 INJECTION INTRAMUSCULAR; INTRAVENOUS; SUBCUTANEOUS at 18:08

## 2025-03-27 RX ADMIN — SODIUM CHLORIDE 500 MCG: 9 INJECTION INTRAMUSCULAR; INTRAVENOUS; SUBCUTANEOUS at 18:13

## 2025-03-27 RX ADMIN — SODIUM CHLORIDE 500 MCG: 9 INJECTION INTRAMUSCULAR; INTRAVENOUS; SUBCUTANEOUS at 17:50

## 2025-03-27 RX ADMIN — SODIUM CHLORIDE 500 MCG: 9 INJECTION INTRAMUSCULAR; INTRAVENOUS; SUBCUTANEOUS at 17:40

## 2025-03-27 NOTE — CONSULTS
[facility]   HISTORY AND PHYSICAL    Patient Name: Foster Saldivar  : 1970  MRN: [id number]  Primary Care Physician:  Chad Carnes MD  Date of admission: 3/27/2025    Subjective   Subjective     Chief Complaint: Priapism    HPI:    Foster Saldivar is a 55 y.o. male with a past medical history of GERD, CAD on aspirin, progressive and refractory rectal dysfunction presenting to the emergency department with a priapism.  He is a patient of Dr. Doll in [facility], has been managed for refractory erectile dysfunction with max dose Cialis and Viagra previously, appears he was transition to intracavernosal injections with Edex recently.  Patient injected once, had a good response and had no problems.  Yesterday the patient injected for his second attempt prior to attempted intercourse, he developed an erection which did not go away.  Patient states he went to sleep with his erection, woke up this morning still having a persistent erection which began to become painful.  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  He eventually presented to the emergency department this afternoon.  States has had an erection since 9 PM yesterday.  Has had a persistent erection for at least the last 18 hours.  Emergency room physicians attempted to resolve the priapism by aspiration and phenylephrine injections with no resolution.  Urology was then consulted.    Review of Systems   All systems were reviewed and negative except for: Penile pain    Personal History     Past Medical History:   Diagnosis Date    GERD (gastroesophageal reflux disease)     Heartburn     Neck pain     PONV (postoperative nausea and vomiting)     no sugical hx    Wears glasses \       Past Surgical History:   Procedure Laterality Date    ANTERIOR CERVICAL DISCECTOMY W/ FUSION N/A 2018    Procedure: CERVICAL DISCECTOMY ANTERIOR WITH FUSION WITH BENGAL CAGE ANTERIOR PLATING  C5-7;  Surgeon: Frank Ridley MD;  Location: [facility];  Service: Orthopedic Spine    NO PAST SURGERIES         Family History: family history is not on file. Otherwise pertinent FHx was reviewed and not pertinent to current issue.    Social History:  reports that he has been smoking cigarettes. He started smoking about 36 years ago. He has a 45 pack-year smoking history. He has been exposed to tobacco smoke. He has never used smokeless tobacco. He reports that he does not currently use alcohol. He reports current drug use. Drug: Marijuana.    Home Medications:  Evolocumab, amLODIPine, aspirin, hydroCHLOROthiazide, rosuvastatin, sertraline, sildenafil, and valsartan      Allergies:  No Known Allergies    Objective   Objective     Vitals:   Temp:  [97.9 °F (36.6 °C)] 97.9 °F (36.6 °C)  Heart Rate:  [69-93] 86  Resp:  [16-18] 18  BP: (143-178)/() 160/91  Physical Exam    Constitutional: Awake in bed, alert    Eyes: PERRLA, sclerae anicteric, no conjunctival injection   HENT: Normocephalic, atraumatic, mucous membranes moist   Neck: Supple, trachea midline   Respiratory:Equal chest rise, non-labored respirations    Cardiovascular: RRR, palpable radial pulses bilaterally   Gastrointestinal: Soft, nontender, non-distended   Musculoskeletal: No bilateral ankle edema, no clubbing or cyanosis to extremities   Genitourinary: Circumcised phallus, orthotopic meatus, bilaterally descended testicles palpable without masses, rigid erection with full tumescence noted, multiple bruises along the penile shaft skin, small palpable hematomas noted along the shaft from prior injection attempt   Psychiatric: Appropriate affect, cooperative   Neurologic: Oriented x 3, Cranial Nerves grossly intact, speech clear   Skin: No rashes     Result Review    Result Review:  I have personally reviewed the results from the time of this admission to 3/27/2025 18:22 EDT and agree with these findings:  [x]  Laboratory  []   Microbiology  []  Radiology  []  EKG/Telemetry   []  Cardiology/Vascular   []  Pathology  []  Old records  []  Other:    Most notable findings include: Persistent rigid erection    Assessment & Plan   Assessment / Plan     Brief Patient Summary:  Foster Saldivar is a 55 y.o. male who presents with an erection lasting longer than 18 hours after Edex intracavernosal injection recently prescribed by his local urologist.  Discussed options with patient, bedside reattempt at irrigation, aspiration and phenylephrine injections versus proceed to the operating room for distal corporal/glanular shunt.  I would prefer to start at the bedside.  He elects to proceed.  Informed consent reviewed, risks discussed.  Discussed despite resolution of priapism he may experience permanent erectile dysfunction given prolonged timeline for his priapism.  He reports understanding.    ER bedside procedure  Informed consent was reviewed and signed.  He was placed supine.  Genitals were prepped and draped with 4 x 4 gauze and iodine.  He was draped with blue towels.  I injected 2% lidocaine at the base of the penis with a 23-gauge needle to perform a penile block in addition I performed a ring block circumferentially.    I then inserted an 18-gauge needle into the right lateral penile shaft at the midshaft, immediately encountered dark, purple thick deoxygenated blood consistent with an ischemic priapism.  I remove roughly 100 cc of old blood and injected 500 mcg/mL phenylephrine and patient achieved detumescence.  Unfortunately the patient recurred fairly quickly within the first 5 minutes.    I then inserted a separate 18-gauge needle at the opposite left lateral shaft and removed roughly 150 cc of old deoxygenated blood.  I continued aspirating back until all deoxygenated blood been removed and this began converting to red arterial flow.  At this juncture I was concerned the patient was likely converting to a high flow priapism.    I then  performed 2 separate rounds of 500 mcg/mL phenylephrine injection and applied manual compression at the base    Over the next 10 minutes the patient achieved detumescence.    I monitored the patient for the next 15 minutes and he did not have a priapism recurrence.    This concluded the procedure.    Active Hospital Problems:  Active Hospital Problems    Diagnosis     Ischemic priapism      Plan:   -Recommend discharge home with demonstrate back x 1 week to prevent penile skin infection/abscess  -Follow back up with local urologist, do not recommend intracavernosal injection  -Return precautions for refractory or recurrent priapism discussed at length    VTE Prophylaxis:  No VTE prophylaxis order currently exists.        CODE STATUS:       Admission Status:  Discharge from ED.     Electronically signed by Tono Kline MD, 03/27/25, 6:18 PM EDT.

## 2025-03-27 NOTE — ED PROVIDER NOTES
Subjective  History of Present Illness:    Chief Complaint: Prolonged erection  History of Present Illness: 55-year-old male presents with a complaint of a prolonged erection, he states that he had an injection of Trimix at 9:00 last night, he has had an erection since then.  He states he currently still has an erection, upon arrival and evaluation in the ED.  Previously has used Viagra, did not take Viagra within the last day.  Onset: Gradual  Duration: Erection occurred at 9:00 last night and continues  Exacerbating / Alleviating factors: Patient had a dose of Trimix  Associated symptoms: Pain and discomfort      Nurses Notes reviewed and agree, including vitals, allergies, social history and prior medical history.     REVIEW OF SYSTEMS: All systems reviewed and not pertinent unless noted.    Review of Systems   Genitourinary:  Positive for penile pain.        Prolonged erection   All other systems reviewed and are negative.      Past Medical History:   Diagnosis Date    GERD (gastroesophageal reflux disease)     Heartburn     Neck pain     PONV (postoperative nausea and vomiting)     no sugical hx    Wears glasses \       Allergies:    Patient has no known allergies.      Past Surgical History:   Procedure Laterality Date    ANTERIOR CERVICAL DISCECTOMY W/ FUSION N/A 2018    Procedure: CERVICAL DISCECTOMY ANTERIOR WITH FUSION WITH BENGAL CAGE ANTERIOR PLATING C5-7;  Surgeon: Frank Ridley MD;  Location: [facility];  Service: Orthopedic Spine    NO PAST SURGERIES           Social History     Socioeconomic History    Marital status:    Tobacco Use    Smoking status: Some Days     Current packs/day: 0.00     Average packs/day: 1.5 packs/day for 30.0 years (45.0 ttl pk-yrs)     Types: Cigarettes     Start date: 1988     Last attempt to quit: 2018     Years since quittin.9     Passive exposure: Current    Smokeless tobacco: Never   Vaping Use    Vaping status: Never Used   Substance  "and Sexual Activity    Alcohol use: Not Currently     Comment: 3-5 DRINKS, MAYBE 3 DAYS PER WEEK    Drug use: Yes     Types: Marijuana     Comment: OCCASIONAL USE    Sexual activity: Not Currently     Partners: Female         History reviewed. No pertinent family history.    Objective  Physical Exam:  /91 (BP Location: Left arm, Patient Position: Sitting)   Pulse 86   Temp 97.9 °F (36.6 °C) (Oral)   Resp 18   Ht 182.9 cm (72\")   Wt 102 kg (225 lb)   SpO2 97%   BMI 30.52 kg/m²      Physical Exam  Vitals and nursing note reviewed.   Constitutional:       Appearance: He is well-developed.   HENT:      Head: Normocephalic and atraumatic.      Mouth/Throat:      Mouth: Mucous membranes are moist.   Eyes:      Extraocular Movements: Extraocular movements intact.   Cardiovascular:      Rate and Rhythm: Normal rate.   Pulmonary:      Effort: Pulmonary effort is normal.   Abdominal:      Palpations: Abdomen is soft.   Musculoskeletal:         General: Normal range of motion.      Cervical back: Normal range of motion.   Skin:     General: Skin is warm and dry.   Neurological:      General: No focal deficit present.      Mental Status: He is alert.   Psychiatric:         Mood and Affect: Mood normal.           Nerve Block    Date/Time: 3/27/2025 3:36 PM    Performed by: Alex Elizabeth MD  Authorized by: Alex Elizabeth MD    Consent:     Consent obtained:  Verbal    Consent given by:  Patient    Risks discussed:  Allergic reaction, infection, nerve damage, swelling, bleeding, intravenous injection, pain and unsuccessful block  Universal protocol:     Procedure explained and questions answered to patient or proxy's satisfaction: yes      Immediately prior to procedure, a time out was called: yes      Patient identity confirmed:  Verbally with patient and provided demographic data  Indications:     Indications:  Procedural anesthesia  Location:     Body area:  Trunk    Trunk area nerve blocked: penile " - dorsal nerve.  Procedure details:     Block needle gauge:  30 G    Anesthetic injected:  Lidocaine 2% w/o epi  Post-procedure details:     Dressing:  None    Procedure completion:  Tolerated well, no immediate complications  Priapism Injection and Aspiration    Date/Time: 3/27/2025 3:38 PM    Performed by: Alex Elizabeth MD  Authorized by: Alex Elizabeth MD    Consent:     Consent obtained:  Verbal    Consent given by:  Patient    Risks discussed:  Bleeding, infection, pain, incomplete drainage, nerve damage and poor cosmetic result (erectile dysfunction from prolonged priapism)  Universal protocol:     Procedure explained and questions answered to patient or proxy's satisfaction: yes      Immediately prior to procedure, a time out was called: yes      Patient identity confirmed:  Verbally with patient and provided demographic data  Pre-procedure details:     Skin preparation:  Chlorhexidine    Preparation: Patient was prepped and draped in the usual sterile fashion    Sedation:     Sedation type:  None  Anesthesia:     Anesthesia method:  Nerve block    Block technique:  Dorsal nerves  Procedure specific details:      Injection 1 - 1526 - 250mcg/0.5ml per side of phenylephrine  Injection 2 - 5650-5302 - Aspiration and irrigation. 13cc of dark blood aspirated. Finished with 250mcg/0.5ml per side of phenylephrine  1606 - No change. Urology paged.   Post-procedure details:     Patient tolerance of procedure: No change. Priapism persists.  Critical Care    Performed by: Mike James Jr., PA-C  Authorized by: Alex Elizabeth MD    Critical care provider statement:     Critical care time (minutes):  45    Critical care time was exclusive of:  Separately billable procedures and treating other patients and teaching time    Critical care was necessary to treat or prevent imminent or life-threatening deterioration of the following conditions: Acute priapism requiring multiple doses of injected  Hay-Synephrine, bedside aspiration of blood, urology consultation with bedside procedures performed by urology.    Critical care was time spent personally by me on the following activities:  Blood draw for specimens, development of treatment plan with patient or surrogate, discussions with consultants, discussions with primary provider, evaluation of patient's response to treatment, examination of patient, ordering and performing treatments and interventions, re-evaluation of patient's condition and review of old charts    Care discussed with: admitting provider        ED Course:    ED Course as of 03/27/25 2230   Thu Mar 27, 2025   1458 Upon arrival.  I consulted immediately with the ED pharmacist as well as the PA on addressing the patient's prolonged priapism as quickly as possible.  Unfortunately, with the time of erection and priapism, the patient is likely to have some complications and worsened erectile dysfunction associated with the episode. [RS]   1532 I personally evaluated the patient.  I performed a penile nerve block at the dorsal nerves.  This was performed with 1 cc of a 2% lidocaine solution.  I then did the initial injection of 250 mcg in 0.5 cc per side at 1526. [RS]   1607 Patient is now 40 minutes from the initial injection with 2 treatments with 1 total milligram of phenylephrine.  With the length of priapism and the likelihood of complications and permanent dysfunction, we will escalate to urology for further intervention. [RS]   1618 Discussed the case with Dr. Kline he will come and evaluate the patient at bedside and perform procedures to help alleviate the priapism [CS]   1625 No interval change.  Patient updated on the plan with urology enroute. [RS]   1730 Dr. Kline at the bedside for penile block, and drawing blood to decrease the priapism [CS]   1828 I reviewed the plan with the urologist.  Patient had the procedure here with urology with significant and drainage with 1  recurrence and then further drainage.  See his documentation for further details.  Patient to follow-up within 2 weeks with his urologist as well as antibiotic therapy for infection prophylaxis. [RS]      ED Course User Index  [CS] Mike James Jr., JOVANNA  [RS] Alex Elizabeth MD       Lab Results (last 24 hours)       ** No results found for the last 24 hours. **             No radiology results from the last 24 hrs                                                                  Medical Decision Making  Patient Presentation 55-year-old male presented with Priapism    DDX erectile dysfunction medication, blood disorders    Data Review/ Non ED Records /Analysis/Ordering unique tests  Review of previous  non ED visits, prior labs, prior imaging, available notes from prior evaluations or visits with specialists, medication list, allergies, past medical history, past surgical history        Independent Review Studies  I Personally reviewed all laboratory studies performed in the emergency department     Intervention/Re-evaluation intervention included injected Hay-Synephrine, and bedside aspiration of blood    Independent Clinician discussed with my supervising physician Dr. Elizabeth who performed bedside evaluation treatment and procedure    Risk Stratification tools/clinical decision rules patient presented with priapism caused by Trimix, he had an erection for approximately 15 to 18 hours.  He was given injections with Hay-Synephrine, and blood was aspirated by my supervising physician Dr. Elizabeth.  After unsuccessful results, urology was consulted to see the patient at the bedside.  Urology also performed blood aspirations and does Hay-Synephrine, and was successful with reducing the erection.  Patient was able to be discharged, on antibiotics and follow-up with urology, significant concern and risk for worsening symptoms, ischemia, and dysfunction.    Shared Decision Making discussed all findings with  patient and he was agreeable    Disposition patient stable for discharge    Problems Addressed:  Priapism: complicated acute illness or injury    Amount and/or Complexity of Data Reviewed  External Data Reviewed: labs and notes.    Risk  Prescription drug management.          Final diagnoses:   Priapism           Disposition DISCHARGE    Patient discharged in stable condition.    Reviewed implications of results, diagnosis, meds, responsibility to follow up, warning signs and symptoms of possible worsening, potential complications and reasons to return to ER.    Patient/Family voiced understanding of above instructions.    Discussed plan for discharge, as there is no emergent indication for admission.  Pt/family is agreeable and understands need for follow up and possible repeat testing.  Pt/family is aware that discharge does not mean that nothing is wrong but that it indicates no emergency is currently present that requires admission and they must continue care with follow-up as given below or with a physician of their choice.     FOLLOW-UP  [facility] EMERGENCY DEPARTMENT  [address] [facility] [address] [phone]    If symptoms worsen         Medication List        New Prescriptions      sulfamethoxazole-trimethoprim 800-160 MG per tablet  Commonly known as: BACTRIM DS,SEPTRA DS  Take 1 tablet by mouth 2 (Two) Times a Day for 7 days.               Where to Get Your Medications        These medications were sent to [facility] - [address] [phone] [facility] [phone] [address] [address]      Phone: [phone]   sulfamethoxazole-trimethoprim 800-160 MG per tablet             Mike James Jr., PA-C  03/27/25 2115       Mike James Jr., PA-C  03/27/25 2236

## 2025-03-31 ENCOUNTER — OFFICE VISIT (OUTPATIENT)
Dept: FAMILY MEDICINE CLINIC | Facility: CLINIC | Age: 55
End: 2025-03-31
Payer: COMMERCIAL

## 2025-03-31 VITALS
RESPIRATION RATE: 16 BRPM | DIASTOLIC BLOOD PRESSURE: 68 MMHG | WEIGHT: 230.5 LBS | OXYGEN SATURATION: 94 % | BODY MASS INDEX: 31.22 KG/M2 | HEIGHT: 72 IN | HEART RATE: 63 BPM | SYSTOLIC BLOOD PRESSURE: 128 MMHG

## 2025-03-31 DIAGNOSIS — E78.00 HYPERCHOLESTEROLEMIA: ICD-10-CM

## 2025-03-31 DIAGNOSIS — Z12.11 ENCOUNTER FOR SCREENING FOR MALIGNANT NEOPLASM OF COLON: ICD-10-CM

## 2025-03-31 DIAGNOSIS — K63.5 POLYP OF COLON, UNSPECIFIED PART OF COLON, UNSPECIFIED TYPE: ICD-10-CM

## 2025-03-31 DIAGNOSIS — I10 ESSENTIAL HYPERTENSION: Primary | ICD-10-CM

## 2025-03-31 DIAGNOSIS — Z79.899 HIGH RISK MEDICATION USE: ICD-10-CM

## 2025-03-31 DIAGNOSIS — Z11.59 ENCOUNTER FOR HEPATITIS C SCREENING TEST FOR LOW RISK PATIENT: ICD-10-CM

## 2025-03-31 DIAGNOSIS — E53.8 VITAMIN B 12 DEFICIENCY: ICD-10-CM

## 2025-03-31 DIAGNOSIS — E55.9 VITAMIN D DEFICIENCY: ICD-10-CM

## 2025-03-31 DIAGNOSIS — N48.30 PRIAPISM: ICD-10-CM

## 2025-03-31 NOTE — PROGRESS NOTES
Follow Up Office Visit      Patient Name: Foster Saldivar  : 1970   MRN: [id number]     Chief Complaint:    Chief Complaint   Patient presents with    Hypertension     6 WK F/U    Annual Exam       History of Present Illness: Foster Saldivar is a 55 y.o. male who is here today to   follow-up on his chronic medical problems and recent hospitalization.    History of Present Illness  The patient is a 55-year-old male who comes in today for a follow-up of a recent hospitalization.    He reports an overall improvement in his health status, although he experiences occasional episodes of low mood. He attributes this to his advancing age. He has been under the care of a urologist and use an injection to help with his ED, which unfortunately led to a severe adverse reaction, priapism, necessitating an emergency room visit. He describes the pain as the most intense he has ever experienced. During his ER visit, he was administered epinephrine or phenylephrine and underwent a procedure at [facility] where blood was drawn from him.     He continues to take baby aspirin and has approximately 3 days remaining of his antibiotic course prescribed during his ER visit. He is also on sertraline 50 mg for anxiety and depression, Repatha injections and rosuvastatin 20 mg for cholesterol management, valsartan 320 mg, amlodipine 5 mg, and hydrochlorothiazide 12.5 mg for blood pressure control. He has discontinued the use of Viagra and has not gone to use the injection medicine for ED any longer.. He has received a B12 injection and was advised to take B12 vitamins. He has not been taking vitamin D supplements. He has undergone one colonoscopy in his lifetime, during which a small polyp was removed.  He cannot remember the gastroenterologist     he has not been tested for hepatitis C.    SOCIAL HISTORY  The patient admits to smoking a little bit here and there but is trying to quit.    MEDICATIONS  Current: Baby  aspirin, sertraline, Repatha, rosuvastatin, valsartan, amlodipine, hydrochlorothiazide  Review of Systems   Constitutional: Negative for fatigue and fever.   Respiratory: Negative for cough and shortness of breath.    Cardiovascular: Negative for chest pain and palpitations.   Skin: Negative for rash or itching      Subjective      Review of Systems:   Review of Systems    Past Medical History:   Past Medical History:   Diagnosis Date    GERD (gastroesophageal reflux disease)     Heartburn     Neck pain     PONV (postoperative nausea and vomiting)     no sugical hx    Wears glasses \       Past Surgical History:   Past Surgical History:   Procedure Laterality Date    ANTERIOR CERVICAL DISCECTOMY W/ FUSION N/A 5/1/2018    Procedure: CERVICAL DISCECTOMY ANTERIOR WITH FUSION WITH BENGAL CAGE ANTERIOR PLATING C5-7;  Surgeon: Frank Ridley MD;  Location: [facility];  Service: Orthopedic Spine    NO PAST SURGERIES         Family History: History reviewed. No pertinent family history.    Social History:   Social History     Socioeconomic History    Marital status:    Tobacco Use    Smoking status: Some Days     Current packs/day: 1.00     Average packs/day: 1.5 packs/day for 30.2 years (45.2 ttl pk-yrs)     Types: Cigarettes     Start date: 4/27/1988     Last attempt to quit: 4/27/2018     Passive exposure: Current    Smokeless tobacco: Never   Vaping Use    Vaping status: Never Used   Substance and Sexual Activity    Alcohol use: Not Currently     Comment: 3-5 DRINKS, MAYBE 3 DAYS PER WEEK    Drug use: Not Currently     Types: Marijuana     Comment: OCCASIONAL USE    Sexual activity: Not Currently     Partners: Female       Medications:     Current Outpatient Medications:     amLODIPine (NORVASC) 5 MG tablet, Take 1 tablet by mouth Daily., Disp: 90 tablet, Rfl: 3    aspirin 81 MG EC tablet, Take 1 tablet by mouth Daily., Disp: 90 tablet, Rfl: 3    Evolocumab (REPATHA) solution auto-injector SureClick  "injection, Inject 1 mL under the skin into the appropriate area as directed Every 14 (Fourteen) Days., Disp: 6 mL, Rfl: 3    hydroCHLOROthiazide (MICROZIDE) 12.5 MG capsule, Take 1 capsule by mouth Daily., Disp: 90 capsule, Rfl: 3    rosuvastatin (CRESTOR) 20 MG tablet, Take 1 tablet by mouth Daily., Disp: 90 tablet, Rfl: 3    sertraline (ZOLOFT) 50 MG tablet, Take 1 tablet by mouth Daily., Disp: , Rfl:     sulfamethoxazole-trimethoprim (BACTRIM DS,SEPTRA DS) 800-160 MG per tablet, Take 1 tablet by mouth 2 (Two) Times a Day for 7 days., Disp: 14 tablet, Rfl: 0    valsartan (DIOVAN) 320 MG tablet, TAKE ONE TABLET BY MOUTH EVERY night, Disp: 90 tablet, Rfl: 1    sildenafil (Viagra) 100 MG tablet, Take 1 tablet by mouth Daily As Needed for Erectile Dysfunction. (Patient not taking: Reported on 3/31/2025), Disp: 10 tablet, Rfl: 3    Current Facility-Administered Medications:     cyanocobalamin injection 1,000 mcg, 1,000 mcg, Intramuscular, Q28 Days, Chad Carnes MD, 1,000 mcg at 02/17/25 1547    Allergies:   No Known Allergies    Objective     Physical Exam:  Vital Signs:   Vitals:    03/31/25 1300   BP: 128/68   BP Location: Right arm   Patient Position: Sitting   Cuff Size: Large Adult   Pulse: 63   Resp: 16   SpO2: 94%   Weight: 105 kg (230 lb 8 oz)   Height: 182.9 cm (72\")   PainSc: 0-No pain     Facility age limit for growth %sapna is 20 years.  Body mass index is 31.26 kg/m².     Physical Exam  Vitals and nursing note reviewed.   Constitutional:       Appearance: Normal appearance.   HENT:      Head: Normocephalic and atraumatic.      Nose: Nose normal.   Cardiovascular:      Rate and Rhythm: Normal rate and regular rhythm.   Pulmonary:      Effort: Pulmonary effort is normal.      Breath sounds: Normal breath sounds.   Musculoskeletal:         General: Normal range of motion.      Cervical back: Normal range of motion and neck supple.      Right lower leg: No edema.      Left lower leg: No edema.   Skin:     " General: Skin is warm and dry.   Neurological:      General: No focal deficit present.      Mental Status: He is alert.   Psychiatric:         Mood and Affect: Mood normal.         Behavior: Behavior normal.         Procedures    PHQ-9 Total Score:      Assessment / Plan      Assessment/Plan:   Diagnoses and all orders for this visit:    1. Essential hypertension (Primary)    2. Encounter for screening for malignant neoplasm of colon    3. Priapism    4. Vitamin D deficiency    5. Vitamin B 12 deficiency    6. High risk medication use    7. Encounter for hepatitis C screening test for low risk patient    8. Hypercholesterolemia         Assessment & Plan  1. Recent hospitalization follow-up.  His blood pressure has shown significant improvement since the last visit, now at 128/68 compared to the previous reading of 160/91. He is currently on valsartan 320 mg, amlodipine 5 mg, and hydrochlorothiazide 12.5 mg for blood pressure management. He will continue these medications as prescribed by his cardiologist.    2. Cholesterol management.  He is on Repatha and rosuvastatin 20 mg for cholesterol management due to elevated lipoprotein levels. He will continue these medications as prescribed.    3. Anxiety and depression.  He is currently taking sertraline 50 mg for anxiety and depression. He will continue this medication as prescribed.    4. Vitamin B12 deficiency.  His B12 levels are slightly low at 295. He will take B12 supplements at a dosage of 1000 mcg daily for a duration of 3 months, after which the dosage will be gradually reduced.    5. Vitamin D deficiency.  A prescription for vitamin D 5000 IU will be provided, to be taken daily for a period of 2 to 3 months. Subsequently, he can switch to an over-the-counter maintenance dose of 1000 to 2000 IU daily.    6. Colonoscopy.  He had a colonoscopy in the past, possibly over 10 years ago, and may have had a polyp removed. The office will contact the previous provider  to confirm the date of the last colonoscopy and schedule a new one if due.      We called [facility] they have no record of his previous colonoscopy and called Dr. Esteban his former primary care physician they have no records available either --so we will go ahead and refer for colonoscopy to follow-up on his previous polyp removal    7. Smoking cessation.  He has quit smoking but occasionally smokes due to stress. He is advised to quit smoking entirely for better health outcomes.    Follow-up  The patient will follow up in late July.    PROCEDURE  The patient underwent a colonoscopy in the past, during which a small polyp was removed.    Encourage good diet exercise medicines as directed follow-up in 3 months repeat blood work    Follow-up with cardiology as directed      Follow Up:   Return in about 17 weeks (around 7/28/2025) for Labs prior next visit, Annual physical.        Patient or patient representative verbalized consent for the use of Ambient Listening during the visit with  Chad Carnes MD for chart documentation. 3/31/2025  17:32 EDT    Chad Carnes MD  [facility]   Portions of note created with Dragon voice recognition technology

## 2025-05-05 ENCOUNTER — TELEPHONE (OUTPATIENT)
Dept: CARDIOLOGY | Facility: CLINIC | Age: 55
End: 2025-05-05
Payer: COMMERCIAL

## 2025-05-05 NOTE — TELEPHONE ENCOUNTER
Spoke with Mr Yariel and he advised that he did start the repatha injectable and took 3 shots and than stopped. States it is his fault because he just kept forgetting to go get it refilled.  Advised him that once he has been taking it for 3 months we can redraw the lipids and genetic lipids to see if helping.

## 2025-05-28 PROCEDURE — 88305 TISSUE EXAM BY PATHOLOGIST: CPT | Performed by: INTERNAL MEDICINE

## 2025-05-29 ENCOUNTER — LAB REQUISITION (OUTPATIENT)
Dept: LAB | Facility: HOSPITAL | Age: 55
End: 2025-05-29
Payer: COMMERCIAL

## 2025-05-29 DIAGNOSIS — K63.5 POLYP OF COLON: ICD-10-CM

## 2025-05-29 DIAGNOSIS — K64.8 OTHER HEMORRHOIDS: ICD-10-CM

## 2025-05-29 DIAGNOSIS — Z12.11 ENCOUNTER FOR SCREENING FOR MALIGNANT NEOPLASM OF COLON: ICD-10-CM

## 2025-05-29 DIAGNOSIS — D12.3 BENIGN NEOPLASM OF TRANSVERSE COLON: ICD-10-CM

## 2025-05-29 DIAGNOSIS — D12.5 BENIGN NEOPLASM OF SIGMOID COLON: ICD-10-CM

## 2025-05-30 ENCOUNTER — RESULTS FOLLOW-UP (OUTPATIENT)
Dept: LAB | Facility: HOSPITAL | Age: 55
End: 2025-05-30
Payer: COMMERCIAL

## 2025-05-30 LAB
CYTO UR: NORMAL
LAB AP CASE REPORT: NORMAL
LAB AP CLINICAL INFORMATION: NORMAL
PATH REPORT.FINAL DX SPEC: NORMAL
PATH REPORT.GROSS SPEC: NORMAL

## 2025-05-30 NOTE — LETTER
"Foster Saldivar  [address] [facility] [facility] [address]    May 30, 2025     Dear Mr. Saldivar:    Below are the results from your recent visit:    Resulted Orders   Tissue Pathology Exam   Result Value Ref Range    Case Report       Surgical Pathology Report                         Case: [id number]                                  Authorizing Provider:  Micheal Landry MD    Collected:           05/28/2025 11:52 AM          Ordering Location:     [facility]   Received:            05/29/2025 09:52 AM                                 LABORATORY                                                                   Pathologist:           Thierno Chavis MD                                                          Specimens:   1) - Large Intestine, Sigmoid Colon                                                                 2) - Large Intestine, Transverse Colon                                                     Clinical Information       Encounter for screening for malignant neoplasm of colon  Polyp of colon  Benign neoplasm of sigmoid colon  Benign neoplasm of transverse colon  Other hemorrhoids      Final Diagnosis       Sigmoid colon polyp:  Tubular adenoma without high-grade dysplasia  Hyperplastic polyp  2.   Transverse colon polyp:  Tubular adenoma without high-grade dysplasia        Gross Description       1. Large Intestine, Sigmoid Colon.  Received in formalin labeled \"sigmoid colon polyp\" is a 0.7 x 0.3 x 0.2 cm aggregate of 2 tan tissue fragments, submitted entirely in a single cassette.  LDP    2. Large Intestine, Transverse Colon.  Received in formalin labeled \"transverse colon polyp\" is a 1.5 x 0.4 x 0.2 cm aggregate of 2 tan tissue fragments, submitted entirely in a single cassette.  LDP        Microscopic Description       The slides are reviewed and demonstrate histopathologic features supporting the above rendered diagnosis.           Hello,  Your polyps show precancerous cells in this " location consistent with adenomatous tissue. This means that there is no cancer seen but polyp might have become cancerous.  I recommend a repeat colonoscopy in 3 years due to the combination of endoscopic and pathologic findings.     Thank you,   Dr. Landry     Tissue Pathology Exam    If you have any questions or concerns, please don't hesitate to call.         Sincerely,        Micheal Landry MD

## 2025-07-21 ENCOUNTER — LAB (OUTPATIENT)
Dept: FAMILY MEDICINE CLINIC | Facility: CLINIC | Age: 55
End: 2025-07-21
Payer: COMMERCIAL

## 2025-07-21 DIAGNOSIS — Z79.899 HIGH RISK MEDICATION USE: ICD-10-CM

## 2025-07-21 DIAGNOSIS — E78.00 HYPERCHOLESTEROLEMIA: ICD-10-CM

## 2025-07-21 DIAGNOSIS — E55.9 VITAMIN D DEFICIENCY: ICD-10-CM

## 2025-07-21 DIAGNOSIS — E53.8 VITAMIN B 12 DEFICIENCY: ICD-10-CM

## 2025-07-21 DIAGNOSIS — Z11.59 ENCOUNTER FOR HEPATITIS C SCREENING TEST FOR LOW RISK PATIENT: ICD-10-CM

## 2025-07-22 LAB
25(OH)D3+25(OH)D2 SERPL-MCNC: 58.4 NG/ML (ref 30–100)
ALBUMIN SERPL-MCNC: 4.3 G/DL (ref 3.8–4.9)
ALP SERPL-CCNC: 108 IU/L (ref 44–121)
ALT SERPL-CCNC: 39 IU/L (ref 0–44)
AST SERPL-CCNC: 30 IU/L (ref 0–40)
BASOPHILS # BLD AUTO: 0.1 X10E3/UL (ref 0–0.2)
BASOPHILS NFR BLD AUTO: 1 %
BILIRUB SERPL-MCNC: 0.3 MG/DL (ref 0–1.2)
BUN SERPL-MCNC: 17 MG/DL (ref 6–24)
BUN/CREAT SERPL: 20 (ref 9–20)
CALCIUM SERPL-MCNC: 9.7 MG/DL (ref 8.7–10.2)
CHLORIDE SERPL-SCNC: 100 MMOL/L (ref 96–106)
CHOLEST SERPL-MCNC: 86 MG/DL (ref 100–199)
CK SERPL-CCNC: 84 U/L (ref 41–331)
CO2 SERPL-SCNC: 22 MMOL/L (ref 20–29)
CREAT SERPL-MCNC: 0.87 MG/DL (ref 0.76–1.27)
EGFRCR SERPLBLD CKD-EPI 2021: 101 ML/MIN/1.73
EOSINOPHIL # BLD AUTO: 0.3 X10E3/UL (ref 0–0.4)
EOSINOPHIL NFR BLD AUTO: 3 %
ERYTHROCYTE [DISTWIDTH] IN BLOOD BY AUTOMATED COUNT: 12.5 % (ref 11.6–15.4)
GLOBULIN SER CALC-MCNC: 2.4 G/DL (ref 1.5–4.5)
GLUCOSE SERPL-MCNC: 87 MG/DL (ref 70–99)
HCT VFR BLD AUTO: 45.7 % (ref 37.5–51)
HCV IGG SERPL QL IA: NON REACTIVE
HDLC SERPL-MCNC: 56 MG/DL
HGB BLD-MCNC: 15.5 G/DL (ref 13–17.7)
IMM GRANULOCYTES # BLD AUTO: 0 X10E3/UL (ref 0–0.1)
IMM GRANULOCYTES NFR BLD AUTO: 0 %
LDLC SERPL CALC-MCNC: 14 MG/DL (ref 0–99)
LYMPHOCYTES # BLD AUTO: 2.6 X10E3/UL (ref 0.7–3.1)
LYMPHOCYTES NFR BLD AUTO: 23 %
MCH RBC QN AUTO: 34.2 PG (ref 26.6–33)
MCHC RBC AUTO-ENTMCNC: 33.9 G/DL (ref 31.5–35.7)
MCV RBC AUTO: 101 FL (ref 79–97)
MONOCYTES # BLD AUTO: 0.9 X10E3/UL (ref 0.1–0.9)
MONOCYTES NFR BLD AUTO: 8 %
NEUTROPHILS # BLD AUTO: 7.4 X10E3/UL (ref 1.4–7)
NEUTROPHILS NFR BLD AUTO: 65 %
PLATELET # BLD AUTO: 276 X10E3/UL (ref 150–450)
POTASSIUM SERPL-SCNC: 4.5 MMOL/L (ref 3.5–5.2)
PROT SERPL-MCNC: 6.7 G/DL (ref 6–8.5)
RBC # BLD AUTO: 4.53 X10E6/UL (ref 4.14–5.8)
SODIUM SERPL-SCNC: 137 MMOL/L (ref 134–144)
TRIGL SERPL-MCNC: 78 MG/DL (ref 0–149)
VIT B12 SERPL-MCNC: 835 PG/ML (ref 232–1245)
VLDLC SERPL CALC-MCNC: 16 MG/DL (ref 5–40)
WBC # BLD AUTO: 11.4 X10E3/UL (ref 3.4–10.8)

## 2025-07-28 ENCOUNTER — OFFICE VISIT (OUTPATIENT)
Dept: FAMILY MEDICINE CLINIC | Facility: CLINIC | Age: 55
End: 2025-07-28
Payer: COMMERCIAL

## 2025-07-28 VITALS
RESPIRATION RATE: 16 BRPM | BODY MASS INDEX: 31.71 KG/M2 | OXYGEN SATURATION: 96 % | HEIGHT: 72 IN | HEART RATE: 74 BPM | SYSTOLIC BLOOD PRESSURE: 126 MMHG | DIASTOLIC BLOOD PRESSURE: 76 MMHG | WEIGHT: 234.1 LBS

## 2025-07-28 DIAGNOSIS — Z23 IMMUNIZATION DUE: ICD-10-CM

## 2025-07-28 DIAGNOSIS — Z12.2 SCREENING FOR LUNG CANCER: ICD-10-CM

## 2025-07-28 DIAGNOSIS — Z00.00 ANNUAL PHYSICAL EXAM: Primary | ICD-10-CM

## 2025-07-28 DIAGNOSIS — R53.83 FATIGUE, UNSPECIFIED TYPE: ICD-10-CM

## 2025-07-28 DIAGNOSIS — E55.9 VITAMIN D DEFICIENCY: ICD-10-CM

## 2025-07-28 PROCEDURE — 90715 TDAP VACCINE 7 YRS/> IM: CPT | Performed by: FAMILY MEDICINE

## 2025-07-28 PROCEDURE — 99396 PREV VISIT EST AGE 40-64: CPT | Performed by: FAMILY MEDICINE

## 2025-07-28 PROCEDURE — 90471 IMMUNIZATION ADMIN: CPT | Performed by: FAMILY MEDICINE

## 2025-07-28 RX ORDER — SYRINGE AND NEEDLE,INSULIN,1ML 29 G X1/2"
SYRINGE, EMPTY DISPOSABLE MISCELLANEOUS SEE ADMIN INSTRUCTIONS
COMMUNITY
Start: 2025-06-19

## 2025-07-28 RX ORDER — SERTRALINE HYDROCHLORIDE 100 MG/1
100 TABLET, FILM COATED ORAL DAILY
COMMUNITY
Start: 2025-05-01

## 2025-07-28 RX ORDER — CHOLECALCIFEROL (VITAMIN D3) 25 MCG
1000 TABLET ORAL DAILY
Qty: 90 TABLET | Refills: 3 | Status: SHIPPED | OUTPATIENT
Start: 2025-07-28

## 2025-07-28 NOTE — PROGRESS NOTES
Follow Up Office Visit      Patient Name: Foster Saldivar  : 1970   MRN: [id number]     Chief Complaint:    Chief Complaint   Patient presents with    Annual Exam       History of Present Illness: Foster Saldivar is a 55 y.o. 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    He relates he smokes a pack and a half has been doing that for many years now.  Since he was a teenager  History of Present Illness        Subjective      Review of Systems:   Review of Systems  Review of Systems   Constitutional: Negative for fatigue and fever.   HENT: Negative for ear pain and sore throat.    Eyes: Negative for visual disturbance.   Respiratory: Negative for cough, chest tightness and shortness of breath.    Cardiovascular: Negative for chest pain and palpitations.   Gastrointestinal: Negative for abdominal pain, blood in stool, melena, constipation, diarrhea, nausea and vomiting.   Endocrine: Negative for cold intolerance and heat intolerance.   Genitourinary: Negative for dysuria and hematuria.   Musculoskeletal: Negative for back pain and joint swelling.   Skin: Negative for rash and wound.   Allergic/Immunologic: Negative for environmental allergies and food allergies.     Past Medical History:   Past Medical History:   Diagnosis Date    GERD (gastroesophageal reflux disease)     Heartburn     Neck pain     PONV (postoperative nausea and vomiting)     no sugical hx    Wears glasses \       Past Surgical History:   Past Surgical History:  "  Procedure Laterality Date    ANTERIOR CERVICAL DISCECTOMY W/ FUSION N/A 5/1/2018    Procedure: CERVICAL DISCECTOMY ANTERIOR WITH FUSION WITH BENGAL CAGE ANTERIOR PLATING C5-7;  Surgeon: Frank Ridley MD;  Location: [facility];  Service: Orthopedic Spine    NO PAST SURGERIES         Family History: History reviewed. No pertinent family history.    Social History:   Social History     Socioeconomic History    Marital status:    Tobacco Use    Smoking status: Some Days     Current packs/day: 1.00     Average packs/day: 1.5 packs/day for 30.6 years (45.6 ttl pk-yrs)     Types: Cigarettes     Start date: 4/27/1988     Last attempt to quit: 4/27/2018     Passive exposure: Current    Smokeless tobacco: Never   Vaping Use    Vaping status: Never Used   Substance and Sexual Activity    Alcohol use: Not Currently     Comment: 3-5 DRINKS, MAYBE 3 DAYS PER WEEK    Drug use: Not Currently     Types: Marijuana     Comment: OCCASIONAL USE    Sexual activity: Not Currently     Partners: Female       Medications:     Current Outpatient Medications:     amLODIPine (NORVASC) 5 MG tablet, Take 1 tablet by mouth Daily., Disp: 90 tablet, Rfl: 3    aspirin 81 MG EC tablet, Take 1 tablet by mouth Daily., Disp: 90 tablet, Rfl: 3    Evolocumab (REPATHA) solution auto-injector SureClick injection, Inject 1 mL under the skin into the appropriate area as directed Every 14 (Fourteen) Days., Disp: 6 mL, Rfl: 3    hydroCHLOROthiazide (MICROZIDE) 12.5 MG capsule, Take 1 capsule by mouth Daily., Disp: 90 capsule, Rfl: 3    rosuvastatin (CRESTOR) 20 MG tablet, Take 1 tablet by mouth Daily., Disp: 90 tablet, Rfl: 3    sertraline (ZOLOFT) 100 MG tablet, Take 1 tablet by mouth Daily., Disp: , Rfl:     SURE COMFORT INS SYR 1CC/29G 29G X 1/2\" 1 ML misc, See Admin Instructions., Disp: , Rfl:     valsartan (DIOVAN) 320 MG tablet, TAKE ONE TABLET BY MOUTH EVERY night, Disp: 90 tablet, Rfl: 1    cholecalciferol (Vitamin D) 25 MCG (1000 UT) " "tablet, Take 1 tablet by mouth Daily., Disp: 90 tablet, Rfl: 3    sildenafil (Viagra) 100 MG tablet, Take 1 tablet by mouth Daily As Needed for Erectile Dysfunction. (Patient not taking: Reported on 7/28/2025), Disp: 10 tablet, Rfl: 3    Current Facility-Administered Medications:     cyanocobalamin injection 1,000 mcg, 1,000 mcg, Intramuscular, Q28 Days, hCad Carnes MD, 1,000 mcg at 02/17/25 0967    Allergies:   No Known Allergies    Objective     Physical Exam:  Vital Signs:   Vitals:    07/28/25 1300   BP: 126/76   Pulse: 74   Resp: 16   SpO2: 96%   Weight: 106 kg (234 lb 1.6 oz)   Height: 182.9 cm (72\")   PainSc: 0-No pain     Facility age limit for growth %sapna is 20 years.  Body mass index is 31.75 kg/m².     Physical Exam  Vitals and nursing note reviewed.   Constitutional:       General: He is not in acute distress.     Appearance: Normal appearance.   HENT:      Head: Normocephalic and atraumatic.      Right Ear: Tympanic membrane, ear canal and external ear normal.      Left Ear: Tympanic membrane, ear canal and external ear normal.      Ears:      Comments: Excessive wax bilaterally     Nose: Nose normal.      Mouth/Throat:      Mouth: Mucous membranes are moist.      Pharynx: Oropharynx is clear.   Eyes:      Extraocular Movements: Extraocular movements intact.      Conjunctiva/sclera: Conjunctivae normal.      Pupils: Pupils are equal, round, and reactive to light.   Neck:      Thyroid: No thyroid mass or thyroid tenderness.   Cardiovascular:      Rate and Rhythm: Normal rate and regular rhythm.      Heart sounds: Normal heart sounds.      Comments: RADIAL PULSES NML  Pulmonary:      Effort: Pulmonary effort is normal.      Breath sounds: Normal breath sounds.   Abdominal:      General: Abdomen is flat. Bowel sounds are normal.      Palpations: Abdomen is soft. There is no mass.      Tenderness: There is no abdominal tenderness. There is no guarding or rebound.   Genitourinary:     Comments: He " deferred  rectal exam at this time relates he just had his colonoscopy fairly recently  Musculoskeletal:      Cervical back: Normal range of motion and neck supple.      Right lower leg: No edema.      Left lower leg: No edema.   Lymphadenopathy:      Cervical: No cervical adenopathy.   Skin:     General: Skin is warm and dry.      Findings: No rash.   Neurological:      General: No focal deficit present.      Mental Status: He is alert and oriented to person, place, and time.      Cranial Nerves: No cranial nerve deficit.      Sensory: Sensation is intact.      Motor: Motor function is intact.      Deep Tendon Reflexes: Reflexes normal.      Comments: SYMMETRIC PATELLAR REFLEXES  Cranial nerves 2 through 12 intact   Psychiatric:         Attention and Perception: Attention normal.         Mood and Affect: Mood normal.         Behavior: Behavior normal.         Thought Content: Thought content normal.         Judgment: Judgment normal.         Procedures    PHQ-9 Total Score:      Assessment / Plan      Assessment/Plan:   Diagnoses and all orders for this visit:    1. Annual physical exam (Primary)    2. Vitamin D deficiency  -     cholecalciferol (Vitamin D) 25 MCG (1000 UT) tablet; Take 1 tablet by mouth Daily.  Dispense: 90 tablet; Refill: 3    3. Fatigue, unspecified type  -     Testosterone; Future    4. Screening for lung cancer  -      CT Chest Low Dose Cancer Screening WO; Future    5. Immunization due  -     Tdap Vaccine Greater Than or Equal To 8yo IM       Annual physical completed as well as regular office visit    Will check a testosterone level in the next week or so early morning as requested    For tobacco abuse we will get a low-dose CT screening to evaluate for lung cancer    Continue vitamin D but at the lower levels now 1000 international units/day    Continue other meds and follow-up with cardiology as directed    Encouraged good diet and regular exercise as directed    Will update his tetanus  shot with Boostrix he understands injection site soreness redness would be most common side effects agrees to proceed    He may get shingles shot at the pharmacy    Follow-up on blood work in 1 week    Will follow-up in 6 months for regular checkup  Assessment & Plan             Follow Up:   Return in about 6 months (around 1/28/2026) for Labs prior next visit, Recheck.            Chad Carnes MD  [facility]   Portions of note created with Dragon voice recognition technology

## 2025-07-28 NOTE — PATIENT INSTRUCTIONS

## 2025-08-06 ENCOUNTER — TELEPHONE (OUTPATIENT)
Dept: CARDIOLOGY | Facility: CLINIC | Age: 55
End: 2025-08-06
Payer: COMMERCIAL

## 2025-08-11 ENCOUNTER — TELEPHONE (OUTPATIENT)
Dept: ADMINISTRATIVE | Facility: OTHER | Age: 55
End: 2025-08-11
Payer: COMMERCIAL

## (undated) DEVICE — KITTNER SPONGE: Brand: DEROYAL

## (undated) DEVICE — DRN PENRS 1/4X18IN LTX

## (undated) DEVICE — UNDYED BRAIDED (POLYGLACTIN 910), SYNTHETIC ABSORBABLE SUTURE: Brand: COATED VICRYL

## (undated) DEVICE — APPL CHLORAPREP W/TINT 26ML BLU

## (undated) DEVICE — GLV SURG GRN DERMASSURE LF PF 7.5

## (undated) DEVICE — OIL CARTRIDGE: Brand: CORE, MAESTRO

## (undated) DEVICE — GLV SURG SENSICARE MICRO PF LF 8 STRL

## (undated) DEVICE — CANNULA,OXY,ADULT,SUPERSOFT,W/7'TUB,UC: Brand: MEDLINE

## (undated) DEVICE — 1010 S-DRAPE TOWEL DRAPE 10/BX: Brand: STERI-DRAPE™

## (undated) DEVICE — GOWN,PREVENTION PLUS,XXLARGE,STERILE: Brand: MEDLINE

## (undated) DEVICE — 2963 MEDIPORE SOFT CLOTH TAPE 3 IN X 10 YD 12 RLS/CS: Brand: 3M™ MEDIPORE™

## (undated) DEVICE — ANTIBACTERIAL UNDYED BRAIDED (POLYGLACTIN 910), SYNTHETIC ABSORBABLE SUTURE: Brand: COATED VICRYL

## (undated) DEVICE — MEDI-VAC YANKAUER SUCTION HANDLE W/BULBOUS TIP: Brand: CARDINAL HEALTH

## (undated) DEVICE — EXTENSION TAB: Brand: DEROYAL

## (undated) DEVICE — ADHS LIQ MASTISOL 2/3ML

## (undated) DEVICE — SUCTION CANISTER, 2500CC, RIGID: Brand: DEROYAL

## (undated) DEVICE — PAD,NON-ADHERENT,2X3,STERILE,LF,1/PK: Brand: MEDLINE

## (undated) DEVICE — LO CONTOUR COLLAR: Brand: DEROYAL

## (undated) DEVICE — INTENDED FOR TISSUE SEPARATION, AND OTHER PROCEDURES THAT REQUIRE A SHARP SURGICAL BLADE TO PUNCTURE OR CUT.: Brand: BARD-PARKER ® STAINLESS STEEL BLADES

## (undated) DEVICE — PK SPINE ORTHO 10

## (undated) DEVICE — 3M™ STERI-STRIP™ ANTIMICROBIAL SKIN CLOSURES 1/2 INCH X 4 INCHES 50/CARTON 4 CARTONS/CASE A1847: Brand: 3M™ STERI-STRIP™

## (undated) DEVICE — DIFFUSER: Brand: CORE, MAESTRO

## (undated) DEVICE — ENCORE® LATEX MICRO SIZE 7.5, STERILE LATEX POWDER-FREE SURGICAL GLOVE: Brand: ENCORE

## (undated) DEVICE — SKYLINE ANTERIOR CERVICAL PLATE SYSTEM DRILL 2.2 X 12MM: Brand: SKYLINE

## (undated) DEVICE — 3M™ STERI-DRAPE™ INSTRUMENT POUCH 1018: Brand: STERI-DRAPE™

## (undated) DEVICE — AIRWY SZ11

## (undated) DEVICE — PIN SFTY LG LF

## (undated) DEVICE — SPNG GZ WOVN 4X4IN 12PLY 10/BX STRL

## (undated) DEVICE — PAD ARMBRD SURG CONVOL 7.5X20X2IN

## (undated) DEVICE — GAUZE,SPONGE,4"X4",16PLY,XRAY,STRL,LF: Brand: MEDLINE

## (undated) DEVICE — MEDI-VAC NON-CONDUCTIVE SUCTION TUBING: Brand: CARDINAL HEALTH

## (undated) DEVICE — GOWN,REINF,POLY,ECL,PP SLV,XL: Brand: MEDLINE

## (undated) DEVICE — GLV SURG TRIUMPH ORTHO W/ALOE PF LTX 8 STRL